# Patient Record
Sex: FEMALE | Race: WHITE | NOT HISPANIC OR LATINO | Employment: UNEMPLOYED | URBAN - METROPOLITAN AREA
[De-identification: names, ages, dates, MRNs, and addresses within clinical notes are randomized per-mention and may not be internally consistent; named-entity substitution may affect disease eponyms.]

---

## 2017-07-01 ENCOUNTER — APPOINTMENT (EMERGENCY)
Dept: CT IMAGING | Facility: HOSPITAL | Age: 67
End: 2017-07-01
Payer: COMMERCIAL

## 2017-07-01 ENCOUNTER — HOSPITAL ENCOUNTER (EMERGENCY)
Facility: HOSPITAL | Age: 67
Discharge: HOME | End: 2017-07-01
Attending: EMERGENCY MEDICINE | Admitting: EMERGENCY MEDICINE
Payer: COMMERCIAL

## 2017-07-01 VITALS
HEART RATE: 100 BPM | TEMPERATURE: 97.6 F | OXYGEN SATURATION: 96 % | SYSTOLIC BLOOD PRESSURE: 170 MMHG | DIASTOLIC BLOOD PRESSURE: 77 MMHG | WEIGHT: 245 LBS | RESPIRATION RATE: 18 BRPM

## 2017-07-01 DIAGNOSIS — N20.0 NEPHROLITHIASIS: Primary | ICD-10-CM

## 2017-07-01 DIAGNOSIS — R10.9 LEFT FLANK PAIN: ICD-10-CM

## 2017-07-01 LAB
ALBUMIN SERPL BCP-MCNC: 3.6 G/DL (ref 3.5–5)
ALP SERPL-CCNC: 82 U/L (ref 46–116)
ALT SERPL W P-5'-P-CCNC: 33 U/L (ref 12–78)
ANION GAP SERPL CALCULATED.3IONS-SCNC: 9 MMOL/L (ref 4–13)
AST SERPL W P-5'-P-CCNC: 15 U/L (ref 5–45)
BACTERIA UR QL AUTO: ABNORMAL /HPF
BASOPHILS # BLD AUTO: 0.03 THOUSANDS/ΜL (ref 0–0.1)
BASOPHILS NFR BLD AUTO: 0 % (ref 0–1)
BILIRUB SERPL-MCNC: 0.39 MG/DL (ref 0.2–1)
BILIRUB UR QL STRIP: ABNORMAL
BUN SERPL-MCNC: 21 MG/DL (ref 5–25)
CALCIUM SERPL-MCNC: 8.9 MG/DL (ref 8.3–10.1)
CHLORIDE SERPL-SCNC: 99 MMOL/L (ref 100–108)
CLARITY UR: ABNORMAL
CO2 SERPL-SCNC: 26 MMOL/L (ref 21–32)
COLOR UR: YELLOW
CREAT SERPL-MCNC: 0.9 MG/DL (ref 0.6–1.3)
EOSINOPHIL # BLD AUTO: 0.03 THOUSAND/ΜL (ref 0–0.61)
EOSINOPHIL NFR BLD AUTO: 0 % (ref 0–6)
ERYTHROCYTE [DISTWIDTH] IN BLOOD BY AUTOMATED COUNT: 15.4 % (ref 11.6–15.1)
GFR SERPL CREATININE-BSD FRML MDRD: >60 ML/MIN/1.73SQ M
GLUCOSE SERPL-MCNC: 126 MG/DL (ref 65–140)
GLUCOSE UR STRIP-MCNC: NEGATIVE MG/DL
HCT VFR BLD AUTO: 36.9 % (ref 34.8–46.1)
HGB BLD-MCNC: 12.1 G/DL (ref 11.5–15.4)
HGB UR QL STRIP.AUTO: ABNORMAL
KETONES UR STRIP-MCNC: NEGATIVE MG/DL
LEUKOCYTE ESTERASE UR QL STRIP: ABNORMAL
LYMPHOCYTES # BLD AUTO: 1.88 THOUSANDS/ΜL (ref 0.6–4.47)
LYMPHOCYTES NFR BLD AUTO: 11 % (ref 14–44)
MCH RBC QN AUTO: 26.1 PG (ref 26.8–34.3)
MCHC RBC AUTO-ENTMCNC: 32.8 G/DL (ref 31.4–37.4)
MCV RBC AUTO: 80 FL (ref 82–98)
MONOCYTES # BLD AUTO: 1.22 THOUSAND/ΜL (ref 0.17–1.22)
MONOCYTES NFR BLD AUTO: 7 % (ref 4–12)
NEUTROPHILS # BLD AUTO: 13.99 THOUSANDS/ΜL (ref 1.85–7.62)
NEUTS SEG NFR BLD AUTO: 82 % (ref 43–75)
NITRITE UR QL STRIP: NEGATIVE
NON-SQ EPI CELLS URNS QL MICRO: ABNORMAL /HPF
NRBC BLD AUTO-RTO: 0 /100 WBCS
PH UR STRIP.AUTO: 5.5 [PH] (ref 4.5–8)
PLATELET # BLD AUTO: 332 THOUSANDS/UL (ref 149–390)
PMV BLD AUTO: 9.9 FL (ref 8.9–12.7)
POTASSIUM SERPL-SCNC: 4.3 MMOL/L (ref 3.5–5.3)
PROT SERPL-MCNC: 7.8 G/DL (ref 6.4–8.2)
PROT UR STRIP-MCNC: ABNORMAL MG/DL
RBC # BLD AUTO: 4.64 MILLION/UL (ref 3.81–5.12)
RBC #/AREA URNS AUTO: ABNORMAL /HPF
SODIUM SERPL-SCNC: 134 MMOL/L (ref 136–145)
SP GR UR STRIP.AUTO: >=1.03 (ref 1–1.03)
UROBILINOGEN UR QL STRIP.AUTO: 0.2 E.U./DL
WBC # BLD AUTO: 17.15 THOUSAND/UL (ref 4.31–10.16)
WBC #/AREA URNS AUTO: ABNORMAL /HPF

## 2017-07-01 PROCEDURE — 85025 COMPLETE CBC W/AUTO DIFF WBC: CPT | Performed by: EMERGENCY MEDICINE

## 2017-07-01 PROCEDURE — 87086 URINE CULTURE/COLONY COUNT: CPT

## 2017-07-01 PROCEDURE — 96361 HYDRATE IV INFUSION ADD-ON: CPT

## 2017-07-01 PROCEDURE — 96374 THER/PROPH/DIAG INJ IV PUSH: CPT

## 2017-07-01 PROCEDURE — 99284 EMERGENCY DEPT VISIT MOD MDM: CPT

## 2017-07-01 PROCEDURE — 80053 COMPREHEN METABOLIC PANEL: CPT | Performed by: EMERGENCY MEDICINE

## 2017-07-01 PROCEDURE — 96375 TX/PRO/DX INJ NEW DRUG ADDON: CPT

## 2017-07-01 PROCEDURE — 81002 URINALYSIS NONAUTO W/O SCOPE: CPT | Performed by: EMERGENCY MEDICINE

## 2017-07-01 PROCEDURE — 81001 URINALYSIS AUTO W/SCOPE: CPT

## 2017-07-01 PROCEDURE — 74177 CT ABD & PELVIS W/CONTRAST: CPT

## 2017-07-01 PROCEDURE — 36415 COLL VENOUS BLD VENIPUNCTURE: CPT | Performed by: EMERGENCY MEDICINE

## 2017-07-01 RX ORDER — KETOROLAC TROMETHAMINE 30 MG/ML
15 INJECTION, SOLUTION INTRAMUSCULAR; INTRAVENOUS ONCE
Status: COMPLETED | OUTPATIENT
Start: 2017-07-01 | End: 2017-07-01

## 2017-07-01 RX ORDER — ONDANSETRON 2 MG/ML
4 INJECTION INTRAMUSCULAR; INTRAVENOUS ONCE
Status: COMPLETED | OUTPATIENT
Start: 2017-07-01 | End: 2017-07-01

## 2017-07-01 RX ORDER — HYDROCODONE BITARTRATE AND ACETAMINOPHEN 5; 325 MG/1; MG/1
1 TABLET ORAL EVERY 6 HOURS PRN
Qty: 10 TABLET | Refills: 0 | Status: SHIPPED | OUTPATIENT
Start: 2017-07-01 | End: 2018-07-15 | Stop reason: ALTCHOICE

## 2017-07-01 RX ORDER — NAPROXEN 500 MG/1
500 TABLET ORAL 2 TIMES DAILY WITH MEALS
Qty: 30 TABLET | Refills: 0 | Status: SHIPPED | OUTPATIENT
Start: 2017-07-01 | End: 2018-07-15 | Stop reason: ALTCHOICE

## 2017-07-01 RX ADMIN — SODIUM CHLORIDE 1000 ML: 0.9 INJECTION, SOLUTION INTRAVENOUS at 18:12

## 2017-07-01 RX ADMIN — KETOROLAC TROMETHAMINE 15 MG: 30 INJECTION, SOLUTION INTRAMUSCULAR at 18:09

## 2017-07-01 RX ADMIN — ONDANSETRON 4 MG: 2 INJECTION INTRAMUSCULAR; INTRAVENOUS at 18:11

## 2017-07-01 RX ADMIN — IOHEXOL 100 ML: 350 INJECTION, SOLUTION INTRAVENOUS at 18:55

## 2017-07-02 LAB — BACTERIA UR CULT: NORMAL

## 2017-07-06 ENCOUNTER — ALLSCRIPTS OFFICE VISIT (OUTPATIENT)
Dept: OTHER | Facility: OTHER | Age: 67
End: 2017-07-06

## 2017-07-06 DIAGNOSIS — N20.1 CALCULUS OF URETER: ICD-10-CM

## 2017-07-06 LAB
BILIRUB UR QL STRIP: NEGATIVE
CLARITY UR: NORMAL
COLOR UR: YELLOW
GLUCOSE (HISTORICAL): NEGATIVE
HGB UR QL STRIP.AUTO: NORMAL
KETONES UR STRIP-MCNC: NEGATIVE MG/DL
LEUKOCYTE ESTERASE UR QL STRIP: NORMAL
NITRITE UR QL STRIP: NEGATIVE
PH UR STRIP.AUTO: 7 [PH]
PROT UR STRIP-MCNC: NEGATIVE MG/DL
SP GR UR STRIP.AUTO: 1.01
UROBILINOGEN UR QL STRIP.AUTO: NORMAL

## 2017-07-24 ENCOUNTER — ALLSCRIPTS OFFICE VISIT (OUTPATIENT)
Dept: OTHER | Facility: OTHER | Age: 67
End: 2017-07-24

## 2017-07-24 LAB
BILIRUB UR QL STRIP: NORMAL
CLARITY UR: NORMAL
COLOR UR: YELLOW
GLUCOSE (HISTORICAL): NORMAL
HGB UR QL STRIP.AUTO: NORMAL
KETONES UR STRIP-MCNC: NORMAL MG/DL
LEUKOCYTE ESTERASE UR QL STRIP: NORMAL
NITRITE UR QL STRIP: NORMAL
PH UR STRIP.AUTO: 6 [PH]
PROT UR STRIP-MCNC: NORMAL MG/DL
SP GR UR STRIP.AUTO: 1.02
UROBILINOGEN UR QL STRIP.AUTO: 0.2

## 2017-10-20 ENCOUNTER — ALLSCRIPTS OFFICE VISIT (OUTPATIENT)
Dept: OTHER | Facility: OTHER | Age: 67
End: 2017-10-20

## 2017-10-20 ENCOUNTER — APPOINTMENT (OUTPATIENT)
Dept: LAB | Facility: HOSPITAL | Age: 67
End: 2017-10-20
Payer: COMMERCIAL

## 2017-10-20 DIAGNOSIS — R31.29 OTHER MICROSCOPIC HEMATURIA: ICD-10-CM

## 2017-10-20 LAB
BACTERIA UR QL AUTO: ABNORMAL /HPF
BILIRUB UR QL STRIP: NEGATIVE
BILIRUB UR QL STRIP: NORMAL
CLARITY UR: ABNORMAL
CLARITY UR: NORMAL
COLOR UR: YELLOW
COLOR UR: YELLOW
GLUCOSE (HISTORICAL): NORMAL
GLUCOSE UR STRIP-MCNC: NEGATIVE MG/DL
HGB UR QL STRIP.AUTO: NEGATIVE
HGB UR QL STRIP.AUTO: NORMAL
HYALINE CASTS #/AREA URNS LPF: ABNORMAL /LPF
KETONES UR STRIP-MCNC: NEGATIVE MG/DL
KETONES UR STRIP-MCNC: NORMAL MG/DL
LEUKOCYTE ESTERASE UR QL STRIP: ABNORMAL
LEUKOCYTE ESTERASE UR QL STRIP: NORMAL
NITRITE UR QL STRIP: NEGATIVE
NITRITE UR QL STRIP: NORMAL
NON-SQ EPI CELLS URNS QL MICRO: ABNORMAL /HPF
PH UR STRIP.AUTO: 6.5 [PH]
PH UR STRIP.AUTO: 6.5 [PH] (ref 4.5–8)
PROT UR STRIP-MCNC: NEGATIVE MG/DL
PROT UR STRIP-MCNC: NORMAL MG/DL
RBC #/AREA URNS AUTO: ABNORMAL /HPF
SP GR UR STRIP.AUTO: 1.01
SP GR UR STRIP.AUTO: 1.02 (ref 1–1.03)
UROBILINOGEN UR QL STRIP.AUTO: 0.2
UROBILINOGEN UR QL STRIP.AUTO: 0.2 E.U./DL
WBC #/AREA URNS AUTO: ABNORMAL /HPF

## 2017-10-20 PROCEDURE — 81001 URINALYSIS AUTO W/SCOPE: CPT

## 2017-10-24 DIAGNOSIS — N13.2 HYDRONEPHROSIS WITH RENAL AND URETERAL CALCULUS OBSTRUCTION: ICD-10-CM

## 2018-01-13 VITALS
HEIGHT: 62 IN | DIASTOLIC BLOOD PRESSURE: 88 MMHG | WEIGHT: 258 LBS | BODY MASS INDEX: 47.48 KG/M2 | SYSTOLIC BLOOD PRESSURE: 172 MMHG

## 2018-01-13 VITALS
WEIGHT: 247 LBS | HEIGHT: 62 IN | BODY MASS INDEX: 45.45 KG/M2 | SYSTOLIC BLOOD PRESSURE: 126 MMHG | DIASTOLIC BLOOD PRESSURE: 72 MMHG

## 2018-01-14 VITALS
BODY MASS INDEX: 42.51 KG/M2 | WEIGHT: 249 LBS | SYSTOLIC BLOOD PRESSURE: 138 MMHG | DIASTOLIC BLOOD PRESSURE: 86 MMHG | HEIGHT: 64 IN

## 2018-07-15 ENCOUNTER — HOSPITAL ENCOUNTER (EMERGENCY)
Facility: HOSPITAL | Age: 68
Discharge: HOME | End: 2018-07-15
Attending: EMERGENCY MEDICINE | Admitting: EMERGENCY MEDICINE
Payer: COMMERCIAL

## 2018-07-15 ENCOUNTER — APPOINTMENT (EMERGENCY)
Dept: CT IMAGING | Facility: HOSPITAL | Age: 68
End: 2018-07-15
Payer: COMMERCIAL

## 2018-07-15 VITALS
SYSTOLIC BLOOD PRESSURE: 149 MMHG | TEMPERATURE: 97.7 F | OXYGEN SATURATION: 98 % | DIASTOLIC BLOOD PRESSURE: 66 MMHG | RESPIRATION RATE: 18 BRPM | HEART RATE: 96 BPM | WEIGHT: 255.2 LBS | BODY MASS INDEX: 46.68 KG/M2

## 2018-07-15 DIAGNOSIS — N20.0 RIGHT KIDNEY STONE: Primary | ICD-10-CM

## 2018-07-15 LAB
ANION GAP SERPL CALCULATED.3IONS-SCNC: 7 MMOL/L (ref 4–13)
BACTERIA UR QL AUTO: ABNORMAL /HPF
BILIRUB UR QL STRIP: NEGATIVE
BUN SERPL-MCNC: 26 MG/DL (ref 5–25)
CALCIUM SERPL-MCNC: 9.9 MG/DL (ref 8.3–10.1)
CHLORIDE SERPL-SCNC: 102 MMOL/L (ref 100–108)
CLARITY UR: ABNORMAL
CO2 SERPL-SCNC: 27 MMOL/L (ref 21–32)
COLOR UR: YELLOW
CREAT SERPL-MCNC: 1 MG/DL (ref 0.6–1.3)
GFR SERPL CREATININE-BSD FRML MDRD: 58 ML/MIN/1.73SQ M
GLUCOSE SERPL-MCNC: 137 MG/DL (ref 65–140)
GLUCOSE UR STRIP-MCNC: NEGATIVE MG/DL
HGB UR QL STRIP.AUTO: ABNORMAL
KETONES UR STRIP-MCNC: NEGATIVE MG/DL
LEUKOCYTE ESTERASE UR QL STRIP: ABNORMAL
NITRITE UR QL STRIP: NEGATIVE
NON-SQ EPI CELLS URNS QL MICRO: ABNORMAL /HPF
PH UR STRIP.AUTO: 5.5 [PH] (ref 4.5–8)
POTASSIUM SERPL-SCNC: 4.9 MMOL/L (ref 3.5–5.3)
POTASSIUM SERPL-SCNC: 5.5 MMOL/L (ref 3.5–5.3)
PROT UR STRIP-MCNC: ABNORMAL MG/DL
RBC #/AREA URNS AUTO: ABNORMAL /HPF
SODIUM SERPL-SCNC: 136 MMOL/L (ref 136–145)
SP GR UR STRIP.AUTO: 1.02 (ref 1–1.03)
UROBILINOGEN UR QL STRIP.AUTO: 0.2 E.U./DL
WBC #/AREA URNS AUTO: ABNORMAL /HPF

## 2018-07-15 PROCEDURE — 80048 BASIC METABOLIC PNL TOTAL CA: CPT | Performed by: EMERGENCY MEDICINE

## 2018-07-15 PROCEDURE — 36415 COLL VENOUS BLD VENIPUNCTURE: CPT | Performed by: EMERGENCY MEDICINE

## 2018-07-15 PROCEDURE — 99284 EMERGENCY DEPT VISIT MOD MDM: CPT

## 2018-07-15 PROCEDURE — 96361 HYDRATE IV INFUSION ADD-ON: CPT

## 2018-07-15 PROCEDURE — 74176 CT ABD & PELVIS W/O CONTRAST: CPT

## 2018-07-15 PROCEDURE — 81001 URINALYSIS AUTO W/SCOPE: CPT

## 2018-07-15 PROCEDURE — 96374 THER/PROPH/DIAG INJ IV PUSH: CPT

## 2018-07-15 PROCEDURE — 84132 ASSAY OF SERUM POTASSIUM: CPT | Performed by: EMERGENCY MEDICINE

## 2018-07-15 PROCEDURE — 96375 TX/PRO/DX INJ NEW DRUG ADDON: CPT

## 2018-07-15 RX ORDER — FENTANYL CITRATE 50 UG/ML
50 INJECTION, SOLUTION INTRAMUSCULAR; INTRAVENOUS ONCE
Status: COMPLETED | OUTPATIENT
Start: 2018-07-15 | End: 2018-07-15

## 2018-07-15 RX ORDER — MORPHINE SULFATE 4 MG/ML
4 INJECTION, SOLUTION INTRAMUSCULAR; INTRAVENOUS ONCE
Status: DISCONTINUED | OUTPATIENT
Start: 2018-07-15 | End: 2018-07-15

## 2018-07-15 RX ORDER — ONDANSETRON 2 MG/ML
4 INJECTION INTRAMUSCULAR; INTRAVENOUS ONCE
Status: COMPLETED | OUTPATIENT
Start: 2018-07-15 | End: 2018-07-15

## 2018-07-15 RX ORDER — ONDANSETRON 4 MG/1
4 TABLET, ORALLY DISINTEGRATING ORAL EVERY 6 HOURS PRN
Qty: 12 TABLET | Refills: 0 | Status: SHIPPED | OUTPATIENT
Start: 2018-07-15 | End: 2018-07-17

## 2018-07-15 RX ORDER — HYDROCODONE BITARTRATE AND ACETAMINOPHEN 5; 325 MG/1; MG/1
1 TABLET ORAL EVERY 6 HOURS PRN
Qty: 10 TABLET | Refills: 0 | Status: SHIPPED | OUTPATIENT
Start: 2018-07-15 | End: 2018-07-17

## 2018-07-15 RX ORDER — IBUPROFEN 600 MG/1
600 TABLET ORAL EVERY 6 HOURS PRN
Qty: 20 TABLET | Refills: 0 | Status: SHIPPED | OUTPATIENT
Start: 2018-07-15

## 2018-07-15 RX ORDER — LISINOPRIL 10 MG/1
10 TABLET ORAL 2 TIMES DAILY
COMMUNITY

## 2018-07-15 RX ADMIN — FENTANYL CITRATE 50 MCG: 50 INJECTION INTRAMUSCULAR; INTRAVENOUS at 13:15

## 2018-07-15 RX ADMIN — SODIUM CHLORIDE 1000 ML: 0.9 INJECTION, SOLUTION INTRAVENOUS at 13:12

## 2018-07-15 RX ADMIN — ONDANSETRON 4 MG: 2 INJECTION INTRAMUSCULAR; INTRAVENOUS at 13:13

## 2018-07-15 NOTE — ED NOTES
Pt's O2 saturation registering at 92% on room air  Upon entering room pt maintaining own airway and breathing RR at 14  Pt states, "I feel very relaxed right now  I don't feel any pain " Pt placed on 2 L/min DOROTA Cobian RN  07/15/18 5103

## 2018-07-15 NOTE — DISCHARGE INSTRUCTIONS
Kidney Stones   WHAT YOU NEED TO KNOW:   Kidney stones form in the urinary system when the water and waste in your urine are out of balance  When this happens, certain types of waste crystals separate from the urine  The crystals build up and form kidney stones  You may have 1 or more kidney stones  DISCHARGE INSTRUCTIONS:   Return to the emergency department if:   · You have vomiting that is not relieved by medicine  Contact your healthcare provider if:   · You have a fever  · You have trouble passing urine  · You see blood in your urine  · You have severe pain  · You have any questions or concerns about your condition or care  Medicines:   · NSAIDs , such as ibuprofen, help decrease swelling, pain, and fever  This medicine is available with or without a doctor's order  NSAIDs can cause stomach bleeding or kidney problems in certain people  If you take blood thinner medicine, always ask your healthcare provider if NSAIDs are safe for you  Always read the medicine label and follow directions  · Prescription medicine  may be given  Ask how to take this medicine safely  · Medicines  to balance your electrolytes may be needed  · Take your medicine as directed  Contact your healthcare provider if you think your medicine is not helping or if you have side effects  Tell him or her if you are allergic to any medicine  Keep a list of the medicines, vitamins, and herbs you take  Include the amounts, and when and why you take them  Bring the list or the pill bottles to follow-up visits  Carry your medicine list with you in case of an emergency  Follow up with your healthcare provider as directed: You may need to return for more tests  Write down your questions so you remember to ask them during your visits  Self-care:   · Drink plenty of liquids  Your healthcare provider may tell you to drink at least 8 to 12 (eight-ounce) cups of liquids each day   This helps flush out the kidney stones when you urinate  Water is the best liquid to drink  · Strain your urine every time you go to the bathroom  Urinate through a strainer or a piece of thin cloth to catch the stones  Take the stones to your healthcare provider so they can be sent to the lab for tests  This will help your healthcare providers plan the best treatment for you  · Eat a variety of healthy foods  Healthy foods include fruits, vegetables, whole-grain breads, low-fat dairy products, beans, and fish  You may need to limit how much sodium (salt) or protein you eat  Ask for information about the best foods for you  · Stay active  Your stones may pass more easily by if you stay active  Ask about the best activities for you  After you pass your kidney stones:  Once you have passed your kidney stones, your healthcare provider may  order a 24-hour urine test  Results from a 24-hour urine test will help your healthcare provider plan ways to prevent more stones from forming  If you are told to do a 24-hour test, your healthcare provider will give you more instructions  © 2017 [facility] Information is for End User's use only and may not be sold, redistributed or otherwise used for commercial purposes  All illustrations and images included in CareNotes® are the copyrighted property of A D A M , Inc  or Anish Herrera  The above information is an  only  It is not intended as medical advice for individual conditions or treatments  Talk to your doctor, nurse or pharmacist before following any medical regimen to see if it is safe and effective for you

## 2018-07-15 NOTE — ED NOTES
Patient ambulated to restroom at this time  Steady gait noted  No complaints  Urine strained with no evidence of stone present  Dr Florinda Stevens made aware        Brandon Mars RN  07/15/18 5091

## 2018-07-15 NOTE — ED PROVIDER NOTES
History  Chief Complaint   Patient presents with    Flank Pain     right flank pain, started at 10am, history of kidney stones, difficulty passing urine  A 69-year-old female with past medical history of GERD and hypertension; presents with right flank pain  Pain does radiate into the right abdomen  Pain began around 10:00 am this morning  Patient states she has also had difficulty initiating urination  Patient has had nausea but denies vomiting  Patient does report a history of kidney stones, stating her current pain feels similar  Patient did take 2 tablets of Toradol before coming into the emergency department, however gain minimal relief  Patient has not had fevers, chills, chest pain, shortness of breath, dysuria, hematuria, urinary frequency/urgency, peripheral edema and rashes  Patient was seen on 7/12 at urgent care, with symptoms of a urinary tract infection  Patient was diagnosed with a UTI at that time, and has been on Keflex since  Patient states her kidney stones do usually start with symptoms of a UTI  Patient's last stone was approximately 1 year ago, which she passed spontaneously  A/P: Right flank pain, similar symptoms to prior kidney stones  Patient does appear significantly uncomfortable  Right CVA tenderness  Benign abdominal exam   Will check kidney function and CT scan for further evaluation  Will check urine for infection  Will give IVF, pain medication and anti medics  History provided by:  Patient and medical records    Prior to Admission Medications   Prescriptions Last Dose Informant Patient Reported? Taking?    OMEPRAZOLE PO   Yes Yes   Sig: Take 1 tablet by mouth daily   lisinopril (ZESTRIL) 10 mg tablet   Yes Yes   Sig: Take 10 mg by mouth 2 (two) times a day      Facility-Administered Medications: None       Past Medical History:   Diagnosis Date    Cancer ([facility] [address] )     GERD (gastroesophageal reflux disease)     Hypertension        Past Surgical History: Procedure Laterality Date    EYE SURGERY         History reviewed  No pertinent family history  I have reviewed and agree with the history as documented  Social History   Substance Use Topics    Smoking status: Never Smoker    Smokeless tobacco: Never Used    Alcohol use 1 2 oz/week     2 Glasses of wine per week        Review of Systems   Gastrointestinal: Positive for nausea and vomiting  Genitourinary: Positive for difficulty urinating and flank pain ( right)  All other systems reviewed and are negative  Physical Exam  Physical Exam  General Appearance: alert and oriented, appears uncomfortable, non toxic appearing  Skin:  Warm, dry, intact  HEENT: atraumatic, normocephalic  Neck: Supple, trachea midline  Cardiac: RRR; no murmurs, rub, gallops  Pulmonary: lungs CTAB; no wheezes, rales, rhonchi  Gastrointestinal: abdomen soft, nontender, nondistended; no guarding or rebound tenderness; good bowel sounds, no mass or bruits  Right CVA tenderness    Extremities:  no pedal edema, 2+ pulses; no calf tenderness, no clubbing, no cyanosis  Neuro:  no focal motor or sensory deficits, CN 2-12 grossly intact  Psych:  Normal mood and affect, normal judgement and insight      Vital Signs  ED Triage Vitals [07/15/18 1220]   Temperature Pulse Respirations Blood Pressure SpO2   97 7 °F (36 5 °C) 96 18 (!) 214/99 98 %      Temp Source Heart Rate Source Patient Position - Orthostatic VS BP Location FiO2 (%)   Oral Monitor Sitting Right arm --      Pain Score       Worst Possible Pain           Vitals:    07/15/18 1321 07/15/18 1330 07/15/18 1433 07/15/18 1510   BP: (!) 186/86 144/67 144/67 149/66   Pulse: 90 94 88 96   Patient Position - Orthostatic VS: Sitting  Lying Lying       Visual Acuity      ED Medications  Medications   sodium chloride 0 9 % bolus 1,000 mL (0 mL Intravenous Stopped 7/15/18 1506)   ondansetron (ZOFRAN) injection 4 mg (4 mg Intravenous Given 7/15/18 1313)   fentanyl citrate (PF) 100 MCG/2ML 50 mcg (50 mcg Intravenous Given 7/15/18 1315)       Diagnostic Studies  Results Reviewed     Procedure Component Value Units Date/Time    Potassium [[id number]]  (Normal) Collected:  07/15/18 1506    Lab Status:  Final result Specimen:  Blood from Arm, Left Updated:  07/15/18 1525     Potassium 4 9 mmol/L     Basic metabolic panel [[id number]]  (Abnormal) Collected:  07/15/18 1253    Lab Status:  Final result Specimen:  Blood from Hand, Left Updated:  07/15/18 1310     Sodium 136 mmol/L      Potassium 5 5 (H) mmol/L      Chloride 102 mmol/L      CO2 27 mmol/L      Anion Gap 7 mmol/L      BUN 26 (H) mg/dL      Creatinine 1 00 mg/dL      Glucose 137 mg/dL      Calcium 9 9 mg/dL      eGFR 58 ml/min/1 73sq m     Narrative:         National Kidney Disease Education Program recommendations are as follows:  GFR calculation is accurate only with a steady state creatinine  Chronic Kidney disease less than 60 ml/min/1 73 sq  meters  Kidney failure less than 15 ml/min/1 73 sq  meters      Urine Microscopic [[id number]]  (Abnormal) Collected:  07/15/18 1236    Lab Status:  Final result Specimen:  Urine from Urine, Clean Catch Updated:  07/15/18 1303     RBC, UA 20-30 (A) /hpf      WBC, UA 4-10 (A) /hpf      Epithelial Cells Occasional /hpf      Bacteria, UA Occasional /hpf     POCT urinalysis dipstick [[id number]]  (Abnormal) Resulted:  07/15/18 1231    Lab Status:  Final result Updated:  07/15/18 1231    ED Urine Macroscopic [[id number]]  (Abnormal) Collected:  07/15/18 1236    Lab Status:  Final result Specimen:  Urine Updated:  07/15/18 1231     Color, UA Yellow     Clarity, UA Slightly Cloudy     pH, UA 5 5     Leukocytes, UA Small (A)     Nitrite, UA Negative     Protein, UA 30 (1+) (A) mg/dl      Glucose, UA Negative mg/dl      Ketones, UA Negative mg/dl      Urobilinogen, UA 0 2 E U /dl      Bilirubin, UA Negative     Blood, UA Large (A)     Specific [place], UA 1 025    Narrative:       CLINITEK RESULT                 CT renal stone study abdomen pelvis without contrast   Final Result by Taylor Rocha MD (07/15 1315)         1  Right ureterovesical junction 3 mm calculus causing mild right hydroureteronephrosis  2   Bilateral renal subcentimeter nonobstructing calculi  3   Cholelithiasis  4   Moderate-sized hiatal hernia  Workstation performed: [id number]                    Procedures  Procedures       Phone Contacts  ED Phone Contact    ED Course  ED Course as of Jul 16 1406   Sun Jul 15, 2018   1408 Patient resting more comfortably at this time  Will repeat potassium as initial level is hemolyzed  1409 Right 3mm UVJ stone moderate hydronephrosis CT renal stone study abdomen pelvis without contrast   1533 Potassium: 4 9   1548 Pt resting comfortably at this time  Pain well controlled  Nausea resolved  Pt able to urinate without difficulty  Will discharge home with urology follow up  Return precautions given  Recommend completion of Keflex that was started at Urgent Care  MDM  CritCare Time    Disposition  Final diagnoses:   Right kidney stone     Time reflects when diagnosis was documented in both MDM as applicable and the Disposition within this note     Time User Action Codes Description Comment    7/15/2018  3:49 PM Marilynn, [address]] Right kidney stone       ED Disposition     ED Disposition Condition Comment    Discharge  Ronnie Jackson discharge to home/self care      Condition at discharge: Good        Follow-up Information     Follow up With Specialties Details Why Contact Info Additional Information    Brayan Washington MD Urology Schedule an appointment as soon as possible for a visit in 2 days For re-evaluation [address] [place] [address] Emergency Department Emergency Medicine Go to If symptoms worsen [address] [place] [address]  [phone] [address] [place] [address] ED, [facility] [place], [address]          Discharge Medication List as of 7/15/2018  3:55 PM      START taking these medications    Details   HYDROcodone-acetaminophen (NORCO) 5-325 mg per tablet Take 1 tablet by mouth every 6 (six) hours as needed for pain for up to 10 days Max Daily Amount: 4 tablets, Starting Sun 7/15/2018, Until Wed 7/25/2018, Print      ibuprofen (MOTRIN) 600 mg tablet Take 1 tablet (600 mg total) by mouth every 6 (six) hours as needed for mild pain, Starting Sun 7/15/2018, Print      ondansetron (ZOFRAN-ODT) 4 mg disintegrating tablet Take 1 tablet (4 mg total) by mouth every 6 (six) hours as needed for nausea or vomiting, Starting Sun 7/15/2018, Print         CONTINUE these medications which have NOT CHANGED    Details   lisinopril (ZESTRIL) 10 mg tablet Take 10 mg by mouth 2 (two) times a day, Historical Med      OMEPRAZOLE PO Take 1 tablet by mouth daily, Historical Med           No discharge procedures on file      ED Provider  Electronically Signed by           Ailin Elkins DO  07/16/18 7684

## 2018-07-17 ENCOUNTER — APPOINTMENT (EMERGENCY)
Dept: CT IMAGING | Facility: HOSPITAL | Age: 68
End: 2018-07-17
Payer: COMMERCIAL

## 2018-07-17 ENCOUNTER — HOSPITAL ENCOUNTER (EMERGENCY)
Facility: HOSPITAL | Age: 68
Discharge: HOME | End: 2018-07-17
Payer: COMMERCIAL

## 2018-07-17 VITALS
OXYGEN SATURATION: 96 % | BODY MASS INDEX: 47.19 KG/M2 | SYSTOLIC BLOOD PRESSURE: 179 MMHG | WEIGHT: 258 LBS | DIASTOLIC BLOOD PRESSURE: 74 MMHG | TEMPERATURE: 97.5 F | RESPIRATION RATE: 16 BRPM | HEART RATE: 92 BPM

## 2018-07-17 DIAGNOSIS — N23 RENAL COLIC ON RIGHT SIDE: Primary | ICD-10-CM

## 2018-07-17 DIAGNOSIS — R10.9 FLANK PAIN: ICD-10-CM

## 2018-07-17 DIAGNOSIS — I10 HYPERTENSION, UNSPECIFIED TYPE: ICD-10-CM

## 2018-07-17 DIAGNOSIS — D72.829 LEUKOCYTOSIS, UNSPECIFIED TYPE: ICD-10-CM

## 2018-07-17 DIAGNOSIS — R31.9 HEMATURIA, UNSPECIFIED TYPE: ICD-10-CM

## 2018-07-17 DIAGNOSIS — E66.9 OBESITY: ICD-10-CM

## 2018-07-17 LAB
ALBUMIN SERPL BCP-MCNC: 3.7 G/DL (ref 3.5–5)
ALP SERPL-CCNC: 70 U/L (ref 46–116)
ALT SERPL W P-5'-P-CCNC: 26 U/L (ref 12–78)
ANION GAP SERPL CALCULATED.3IONS-SCNC: 7 MMOL/L (ref 4–13)
AST SERPL W P-5'-P-CCNC: 17 U/L (ref 5–45)
BACTERIA UR QL AUTO: ABNORMAL /HPF
BASOPHILS # BLD AUTO: 0.03 THOUSANDS/ΜL (ref 0–0.1)
BASOPHILS NFR BLD AUTO: 0 % (ref 0–1)
BILIRUB SERPL-MCNC: 0.42 MG/DL (ref 0.2–1)
BILIRUB UR QL STRIP: NEGATIVE
BUN SERPL-MCNC: 19 MG/DL (ref 5–25)
CALCIUM SERPL-MCNC: 9.3 MG/DL (ref 8.3–10.1)
CHLORIDE SERPL-SCNC: 96 MMOL/L (ref 100–108)
CLARITY UR: CLEAR
CO2 SERPL-SCNC: 28 MMOL/L (ref 21–32)
COLOR UR: YELLOW
CREAT SERPL-MCNC: 1 MG/DL (ref 0.6–1.3)
EOSINOPHIL # BLD AUTO: 0.17 THOUSAND/ΜL (ref 0–0.61)
EOSINOPHIL NFR BLD AUTO: 1 % (ref 0–6)
ERYTHROCYTE [DISTWIDTH] IN BLOOD BY AUTOMATED COUNT: 15.3 % (ref 11.6–15.1)
GFR SERPL CREATININE-BSD FRML MDRD: 58 ML/MIN/1.73SQ M
GLUCOSE SERPL-MCNC: 116 MG/DL (ref 65–140)
GLUCOSE UR STRIP-MCNC: NEGATIVE MG/DL
HCT VFR BLD AUTO: 37 % (ref 34.8–46.1)
HGB BLD-MCNC: 11.5 G/DL (ref 11.5–15.4)
HGB UR QL STRIP.AUTO: ABNORMAL
KETONES UR STRIP-MCNC: NEGATIVE MG/DL
LEUKOCYTE ESTERASE UR QL STRIP: ABNORMAL
LYMPHOCYTES # BLD AUTO: 3.21 THOUSANDS/ΜL (ref 0.6–4.47)
LYMPHOCYTES NFR BLD AUTO: 20 % (ref 14–44)
MCH RBC QN AUTO: 25.5 PG (ref 26.8–34.3)
MCHC RBC AUTO-ENTMCNC: 31.1 G/DL (ref 31.4–37.4)
MCV RBC AUTO: 82 FL (ref 82–98)
MONOCYTES # BLD AUTO: 1.29 THOUSAND/ΜL (ref 0.17–1.22)
MONOCYTES NFR BLD AUTO: 8 % (ref 4–12)
NEUTROPHILS # BLD AUTO: 11.77 THOUSANDS/ΜL (ref 1.85–7.62)
NEUTS SEG NFR BLD AUTO: 72 % (ref 43–75)
NITRITE UR QL STRIP: NEGATIVE
NON-SQ EPI CELLS URNS QL MICRO: ABNORMAL /HPF
NRBC BLD AUTO-RTO: 0 /100 WBCS
PH UR STRIP.AUTO: 5.5 [PH] (ref 4.5–8)
PLATELET # BLD AUTO: 342 THOUSANDS/UL (ref 149–390)
PMV BLD AUTO: 10.1 FL (ref 8.9–12.7)
POTASSIUM SERPL-SCNC: 4.2 MMOL/L (ref 3.5–5.3)
PROT SERPL-MCNC: 7.5 G/DL (ref 6.4–8.2)
PROT UR STRIP-MCNC: NEGATIVE MG/DL
RBC # BLD AUTO: 4.51 MILLION/UL (ref 3.81–5.12)
RBC #/AREA URNS AUTO: ABNORMAL /HPF
SODIUM SERPL-SCNC: 131 MMOL/L (ref 136–145)
SP GR UR STRIP.AUTO: <=1.005 (ref 1–1.03)
UROBILINOGEN UR QL STRIP.AUTO: 0.2 E.U./DL
WBC # BLD AUTO: 16.47 THOUSAND/UL (ref 4.31–10.16)
WBC #/AREA URNS AUTO: ABNORMAL /HPF

## 2018-07-17 PROCEDURE — 96375 TX/PRO/DX INJ NEW DRUG ADDON: CPT

## 2018-07-17 PROCEDURE — 85025 COMPLETE CBC W/AUTO DIFF WBC: CPT

## 2018-07-17 PROCEDURE — 80053 COMPREHEN METABOLIC PANEL: CPT

## 2018-07-17 PROCEDURE — 96361 HYDRATE IV INFUSION ADD-ON: CPT

## 2018-07-17 PROCEDURE — 81001 URINALYSIS AUTO W/SCOPE: CPT

## 2018-07-17 PROCEDURE — 99284 EMERGENCY DEPT VISIT MOD MDM: CPT

## 2018-07-17 PROCEDURE — 74176 CT ABD & PELVIS W/O CONTRAST: CPT

## 2018-07-17 PROCEDURE — 96374 THER/PROPH/DIAG INJ IV PUSH: CPT

## 2018-07-17 PROCEDURE — 36415 COLL VENOUS BLD VENIPUNCTURE: CPT

## 2018-07-17 RX ORDER — ONDANSETRON 2 MG/ML
4 INJECTION INTRAMUSCULAR; INTRAVENOUS ONCE
Status: COMPLETED | OUTPATIENT
Start: 2018-07-17 | End: 2018-07-17

## 2018-07-17 RX ORDER — FENTANYL CITRATE 50 UG/ML
25 INJECTION, SOLUTION INTRAMUSCULAR; INTRAVENOUS ONCE
Status: COMPLETED | OUTPATIENT
Start: 2018-07-17 | End: 2018-07-17

## 2018-07-17 RX ORDER — DOCUSATE SODIUM 100 MG/1
100 CAPSULE, LIQUID FILLED ORAL DAILY
Qty: 30 CAPSULE | Refills: 0 | Status: SHIPPED | OUTPATIENT
Start: 2018-07-17 | End: 2019-05-31 | Stop reason: ALTCHOICE

## 2018-07-17 RX ADMIN — ONDANSETRON 4 MG: 2 INJECTION INTRAMUSCULAR; INTRAVENOUS at 19:27

## 2018-07-17 RX ADMIN — FENTANYL CITRATE 25 MCG: 50 INJECTION INTRAMUSCULAR; INTRAVENOUS at 19:53

## 2018-07-17 RX ADMIN — SODIUM CHLORIDE 1000 ML: 0.9 INJECTION, SOLUTION INTRAVENOUS at 19:53

## 2018-07-17 NOTE — ED PROVIDER NOTES
History  Chief Complaint   Patient presents with    Flank Pain     pt c/o right flank pain; pt was seen here on 7/15 for the same symptoms and was diagnoised with a 3mm stone; pt denies any n/v but says she has diarrhea  This is a morbidly obese 75year old female who was seen last week at [facility] and diagnosed with a UTI and placed on cephalexin and finished today  She states she was seen here 7/15/18 and dx with a kidney stone, went home and has not passed it  She states she has been straining urine  She states she now has severe right flank pain that radiates into her right groin  Denies v/hematuria  She sees Dr Heri Mullen  History provided by:  Patient and medical records   used: No        Prior to Admission Medications   Prescriptions Last Dose Informant Patient Reported? Taking? OMEPRAZOLE PO   Yes Yes   Sig: Take 1 tablet by mouth daily   ibuprofen (MOTRIN) 600 mg tablet   No Yes   Sig: Take 1 tablet (600 mg total) by mouth every 6 (six) hours as needed for mild pain   lisinopril (ZESTRIL) 10 mg tablet   Yes Yes   Sig: Take 10 mg by mouth 2 (two) times a day      Facility-Administered Medications: None       Past Medical History:   Diagnosis Date    Cancer ([facility] [address] )     GERD (gastroesophageal reflux disease)     Hypertension        Past Surgical History:   Procedure Laterality Date    EYE SURGERY         History reviewed  No pertinent family history  I have reviewed and agree with the history as documented  Social History   Substance Use Topics    Smoking status: Never Smoker    Smokeless tobacco: Never Used    Alcohol use 1 2 oz/week     2 Glasses of wine per week        Review of Systems   Constitutional: Negative  HENT: Negative  Eyes: Negative  Respiratory: Negative  Cardiovascular: Negative  Gastrointestinal: Positive for abdominal pain and nausea  Endocrine: Negative  Genitourinary: Positive for flank pain  Skin: Negative  Allergic/Immunologic: Negative  Neurological: Negative  Hematological: Negative  Psychiatric/Behavioral: Negative  Physical Exam  Physical Exam   Constitutional: She is oriented to person, place, and time  She appears well-developed and well-nourished  She appears distressed  HENT:   Head: Normocephalic and atraumatic  Eyes: EOM are normal  Pupils are equal, round, and reactive to light  Neck: Normal range of motion  Neck supple  Cardiovascular: Normal rate, regular rhythm and normal heart sounds  Pulmonary/Chest: Effort normal and breath sounds normal    Abdominal: Soft  Bowel sounds are normal  She exhibits no distension  There is tenderness  + right CVA and right lower abdominal pain with palpation    Musculoskeletal: Normal range of motion  Neurological: She is alert and oriented to person, place, and time  Skin: Skin is warm and dry  Capillary refill takes less than 2 seconds  She is not diaphoretic  Psychiatric: She has a normal mood and affect  Her behavior is normal  Judgment and thought content normal    Nursing note and vitals reviewed        Vital Signs  ED Triage Vitals [07/17/18 1900]   Temperature Pulse Respirations Blood Pressure SpO2   97 5 °F (36 4 °C) 93 20 142/69 97 %      Temp Source Heart Rate Source Patient Position - Orthostatic VS BP Location FiO2 (%)   Oral Monitor Sitting Right arm --      Pain Score       9           Vitals:    07/17/18 1900 07/17/18 2107 07/17/18 2205   BP: 142/69 (!) 179/73 (!) 179/74   Pulse: 93 97 92   Patient Position - Orthostatic VS: Sitting Lying Lying       Visual Acuity      ED Medications  Medications   ondansetron (ZOFRAN) injection 4 mg (4 mg Intravenous Given 7/17/18 1927)   sodium chloride 0 9 % bolus 1,000 mL (0 mL Intravenous Stopped 7/17/18 2108)   fentanyl citrate (PF) 100 MCG/2ML 25 mcg (25 mcg Intravenous Given 7/17/18 1953)       Diagnostic Studies  Results Reviewed     Procedure Component Value Units Date/Time Urine Microscopic [[id number]]  (Abnormal) Collected:  07/17/18 2048    Lab Status:  Final result Specimen:  Urine from Urine, Clean Catch Updated:  07/17/18 2134     RBC, UA 2-4 (A) /hpf      WBC, UA 2-4 (A) /hpf      Epithelial Cells Occasional /hpf      Bacteria, UA None Seen /hpf     POCT urinalysis dipstick [[id number]]  (Abnormal) Resulted:  07/17/18 2044    Lab Status:  Final result Specimen:  Urine from Urine, Other Updated:  07/17/18 2044    ED Urine Macroscopic [[id number]]  (Abnormal) Collected:  07/17/18 2048    Lab Status:  Final result Specimen:  Urine Updated:  07/17/18 2042     Color, UA Yellow     Clarity, UA Clear     pH, UA 5 5     Leukocytes, UA Trace (A)     Nitrite, UA Negative     Protein, UA Negative mg/dl      Glucose, UA Negative mg/dl      Ketones, UA Negative mg/dl      Urobilinogen, UA 0 2 E U /dl      Bilirubin, UA Negative     Blood, UA Moderate (A)     Specific [place], UA <=1 005    Narrative:       CLINITEK RESULT    Comprehensive metabolic panel [[id number]]  (Abnormal) Collected:  07/17/18 1927    Lab Status:  Final result Specimen:  Blood from Arm, Right Updated:  07/17/18 1952     Sodium 131 (L) mmol/L      Potassium 4 2 mmol/L      Chloride 96 (L) mmol/L      CO2 28 mmol/L      Anion Gap 7 mmol/L      BUN 19 mg/dL      Creatinine 1 00 mg/dL      Glucose 116 mg/dL      Calcium 9 3 mg/dL      AST 17 U/L      ALT 26 U/L      Alkaline Phosphatase 70 U/L      Total Protein 7 5 g/dL      Albumin 3 7 g/dL      Total Bilirubin 0 42 mg/dL      eGFR 58 ml/min/1 73sq m     Narrative:         National Kidney Disease Education Program recommendations are as follows:  GFR calculation is accurate only with a steady state creatinine  Chronic Kidney disease less than 60 ml/min/1 73 sq  meters  Kidney failure less than 15 ml/min/1 73 sq  meters      CBC and differential [1949]  (Abnormal) Collected:  07/17/18 1927    Lab Status:  Final result Specimen:  Blood from Arm, Right Updated:  07/17/18 1942 WBC 16 47 (H) Thousand/uL      RBC 4 51 Million/uL      Hemoglobin 11 5 g/dL      Hematocrit 37 0 %      MCV 82 fL      MCH 25 5 (L) pg      MCHC 31 1 (L) g/dL      RDW 15 3 (H) %      MPV 10 1 fL      Platelets 675 Thousands/uL      nRBC 0 /100 WBCs      Neutrophils Relative 72 %      Lymphocytes Relative 20 %      Monocytes Relative 8 %      Eosinophils Relative 1 %      Basophils Relative 0 %      Neutrophils Absolute 11 77 (H) Thousands/µL      Lymphocytes Absolute 3 21 Thousands/µL      Monocytes Absolute 1 29 (H) Thousand/µL      Eosinophils Absolute 0 17 Thousand/µL      Basophils Absolute 0 03 Thousands/µL                  CT renal stone study abdomen pelvis without contrast   Final Result by Virginia Adames MD (07/17 2145)      1  Bilateral nonobstructing renal calculi  2   Previously identified 3 mm right UVJ calculus now in the bladder, adjacent to or within the right ureteral orifice  3   Cholelithiasis  4   Hiatal hernia containing much of the gastric fundus  Workstation performed: [id number]                    Procedures  Procedures       Phone Contacts  ED Phone Contact    ED Course  ED Course as of Jul 17 2210   Tue Jul 17, 2018 2047 Urine negative for infection  WBC is most likely resulted from stress  Labs reviewed and discussed with pt  Waiting on CT        2148 IMPRESSION:     1   Bilateral nonobstructing renal calculi  2   Previously identified 3 mm right UVJ calculus now in the bladder, adjacent to or within the right ureteral orifice  3   Cholelithiasis  4   Hiatal hernia containing much of the gastric fundus  2149 CT results reviewed and discussed with pt        2155 Pt is resting comfortably on the bed  All results discussed with pt  Explained stone in bladder which it may have been moving earlier  Pt has norco at home and has been taking it on an empty stomach and it makes her sick   Educated on taking 1/2 every 4 hours with food and colace and take motrin as well  Call urologist first thing in am for follow up  Pt verbalizes understanding of d/c instructions and follow up  Urine culture 7/1/18 negative for infection and today urine shows no infection  WBC most likely from stress  Pt verbalizes understanding of d/c instructions and follow up with PCp and urologist as discussed  MDM  Number of Diagnoses or Management Options  Diagnosis management comments: Differential diagnosis:  Infected kidney stone  Pyelonephritis  Renal colic    Plan  Labs  IVF  CT stone protocol  Pain control    Most likely will need to admit       CT results from 7/15/18   Right ureterovesical junction 3 mm calculus causing mild right hydroureteronephrosis  2   Bilateral renal subcentimeter nonobstructing calculi  3   Cholelithiasis  4   Moderate-sized hiatal hernia           Amount and/or Complexity of Data Reviewed  Clinical lab tests: ordered and reviewed  Tests in the radiology section of CPT®: ordered and reviewed  Review and summarize past medical records: yes      CritCare Time    Disposition  Final diagnoses:   Renal colic on right side   Flank pain - right   Leukocytosis, unspecified type   Hematuria, unspecified type   Hypertension, unspecified type   Obesity     Time reflects when diagnosis was documented in both MDM as applicable and the Disposition within this note     Time User Action Codes Description Comment    7/17/2018  9:42 PM Alana Pyo Add [D81] Renal colic on right side     7/17/2018  9:42 PM Alana Pyo Add [R10 9] Flank pain     7/17/2018  9:42 PM Abbie Boy [R10 9] Flank pain right    7/17/2018  9:42 PM [place] [D72 829] Leukocytosis, unspecified type     7/17/2018  9:43 PM Alana Pyo Add [R31 9] Hematuria, unspecified type     7/17/2018  9:43 PM Alana Pyo Add [I10] Hypertension, unspecified type     7/17/2018  9:43 PM Alana Pyo Add [E66 9] Obesity       ED Disposition     ED Disposition Condition Comment    Discharge  Fátima Paredes discharge to home/self care  Condition at discharge: Good        Follow-up Information     Follow up With Specialties Details Why Contact Info Additional [address], [place] Physician Assistant Schedule an appointment as soon as possible for a visit in 2 days  [address]  [facility] [address] [phone]          you are to call your urologist in the morning for an appointment       [address]  Emergency Department Emergency Medicine  If symptoms worsen [address] [facility]  [phone] [place] ED, [address], [place], [address]          Patient's Medications   Discharge Prescriptions    DOCUSATE SODIUM (COLACE) 100 MG CAPSULE    Take 1 capsule (100 mg total) by mouth daily       Start Date: 7/17/2018 End Date: --       Order Dose: 100 mg       Quantity: 30 capsule    Refills: 0     No discharge procedures on file      ED Provider  Electronically Signed by           Angie Gomes  07/17/18 9029

## 2018-07-18 NOTE — DISCHARGE INSTRUCTIONS
Your stone has moved and is in the bladder  You may pass them  Continue to strain your urine  Call your urologist first thing in the morning   You are to take 1/2 of the hydrocodone, eat and take colace so you do not become constipated  Follow up with your PCP  Increase water intake  Avoid caffeine  Kidney Stones   WHAT YOU NEED TO KNOW:   Kidney stones form in the urinary system when the water and waste in your urine are out of balance  When this happens, certain types of waste crystals separate from the urine  The crystals build up and form kidney stones  You may have 1 or more kidney stones  DISCHARGE INSTRUCTIONS:   Return to the emergency department if:   · You have vomiting that is not relieved by medicine  Contact your healthcare provider if:   · You have a fever  · You have trouble passing urine  · You see blood in your urine  · You have severe pain  · You have any questions or concerns about your condition or care  Medicines:   · NSAIDs , such as ibuprofen, help decrease swelling, pain, and fever  This medicine is available with or without a doctor's order  NSAIDs can cause stomach bleeding or kidney problems in certain people  If you take blood thinner medicine, always ask your healthcare provider if NSAIDs are safe for you  Always read the medicine label and follow directions  · Prescription medicine  may be given  Ask how to take this medicine safely  · Medicines  to balance your electrolytes may be needed  · Take your medicine as directed  Contact your healthcare provider if you think your medicine is not helping or if you have side effects  Tell him or her if you are allergic to any medicine  Keep a list of the medicines, vitamins, and herbs you take  Include the amounts, and when and why you take them  Bring the list or the pill bottles to follow-up visits  Carry your medicine list with you in case of an emergency    Follow up with your healthcare provider as directed: You may need to return for more tests  Write down your questions so you remember to ask them during your visits  Self-care:   · Drink plenty of liquids  Your healthcare provider may tell you to drink at least 8 to 12 (eight-ounce) cups of liquids each day  This helps flush out the kidney stones when you urinate  Water is the best liquid to drink  · Strain your urine every time you go to the bathroom  Urinate through a strainer or a piece of thin cloth to catch the stones  Take the stones to your healthcare provider so they can be sent to the lab for tests  This will help your healthcare providers plan the best treatment for you  · Eat a variety of healthy foods  Healthy foods include fruits, vegetables, whole-grain breads, low-fat dairy products, beans, and fish  You may need to limit how much sodium (salt) or protein you eat  Ask for information about the best foods for you  · Stay active  Your stones may pass more easily by if you stay active  Ask about the best activities for you  After you pass your kidney stones:  Once you have passed your kidney stones, your healthcare provider may  order a 24-hour urine test  Results from a 24-hour urine test will help your healthcare provider plan ways to prevent more stones from forming  If you are told to do a 24-hour test, your healthcare provider will give you more instructions  © 2017 [employer] Information is for End User's use only and may not be sold, redistributed or otherwise used for commercial purposes  All illustrations and images included in CareNotes® are the copyrighted property of [employer]  or [employer]  The above information is an  only  It is not intended as medical advice for individual conditions or treatments  Talk to your doctor, nurse or pharmacist before following any medical regimen to see if it is safe and effective for you    How to Strain Your Urine   WHAT YOU NEED TO KNOW:   Urinate into a strainer (funnel with a fine mesh on the bottom) or glass jar to collect kidney stones  DISCHARGE INSTRUCTIONS:   Medicines:   · Pain medicine: You may be given medicine to take away or decrease pain  Do not wait until the pain is severe before you take your medicine  · NSAIDs:  These medicines decrease swelling, pain, and fever  NSAIDs are available without a doctor's order  Ask which medicine is right for you  Ask how much to take and when to take it  Take as directed  NSAIDs can cause stomach bleeding and kidney problems if not taken correctly  · Nausea medicine: This medicine calms your stomach and prevents or controls vomiting  · Take your medicine as directed  Contact your healthcare provider if you think your medicine is not helping or if you have side effects  Tell him of her if you are allergic to any medicine  Keep a list of the medicines, vitamins, and herbs you take  Include the amounts, and when and why you take them  Bring the list or the pill bottles to follow-up visits  Carry your medicine list with you in case of an emergency  Drink liquids as directed:  Drink about 3 liters of liquids each day, or as directed  That equals about 12 glasses of water or fruit juice  Half of your total daily liquids should be water  Limit coffee, tea, and soda to 2 cups daily  Your urine will be pale and clear if you are drinking enough liquid  Self-care:   · Activity:  Exercise, such as walking, may help decrease your pain  · Avoid heat:  Heat may cause you to sweat, urinate less, and become dehydrated  Follow up with your healthcare provider or urologist as directed:  Write down your questions so you remember to ask them during your visits  Contact your healthcare provider or urologist if:   · You have a fever and chills  · Your urine looks cloudy or has a bad smell  · You have burning pain when you urinate  · You have trouble urinating      · You are vomiting and it does not get better, even after you take medicine  · You have questions or concerns about your condition or care  Return to the emergency department if:   · You are not able to urinate  · You have severe pain in your lower abdomen or side  · Your heart flutters or beats faster than usual   © 2017 [employer] Information is for End User's use only and may not be sold, redistributed or otherwise used for commercial purposes  All illustrations and images included in CareNotes® are the copyrighted property of [employer]  or [employer]  The above information is an  only  It is not intended as medical advice for individual conditions or treatments  Talk to your doctor, nurse or pharmacist before following any medical regimen to see if it is safe and effective for you  Flank Pain   WHAT YOU NEED TO KNOW:   Flank pain is felt in the area below your ribcage and above your hip bones, often in the lower back  Your pain may be dull or so severe that you cannot get comfortable  The pain may stay in one area or radiate to another area  It may worsen and lighten in waves  Flank pain is often a sign of problems with your urinary tract, such as a kidney stone or infection  DISCHARGE INSTRUCTIONS:   Return to the emergency department if:   · You have a fever  · Your heart is fluttering or jumping  · You see blood in your urine  · Your pain radiates into your lower abdomen and genital area  · You have intense pain in your low back next to your spine  · You are much more tired than usual and have no desire to eat  · You have a headache and your muscles jerk  Contact your healthcare provider if:   · You have an upset stomach and are vomiting  · You have to urinate more often, and with urgency  · Your pain worsens or does not improve, and you cannot get comfortable  · You pass a stone when you urinate      · You have questions or concerns about your condition or care  Medicines: The following medicines may be ordered for you:  · Pain medicine  may help decrease or relieve your pain  Do not wait until the pain is severe before you take your medicine  · Antibiotics  may help treat a urinary tract infection caused by bacteria  · Take your medicine as directed  Contact your healthcare provider if you think your medicine is not helping or if you have side effects  Tell him of her if you are allergic to any medicine  Keep a list of the medicines, vitamins, and herbs you take  Include the amounts, and when and why you take them  Bring the list or the pill bottles to follow-up visits  Carry your medicine list with you in case of an emergency  Follow up with your healthcare provider in 1 to 2 days or as directed:  Write down your questions so you remember to ask them during your visits  © 2017 [employer] Information is for End User's use only and may not be sold, redistributed or otherwise used for commercial purposes  All illustrations and images included in CareNotes® are the copyrighted property of [employer]  or [employer]  The above information is an  only  It is not intended as medical advice for individual conditions or treatments  Talk to your doctor, nurse or pharmacist before following any medical regimen to see if it is safe and effective for you

## 2018-07-31 ENCOUNTER — OFFICE VISIT (OUTPATIENT)
Dept: UROLOGY | Facility: MEDICAL CENTER | Age: 68
End: 2018-07-31
Payer: COMMERCIAL

## 2018-07-31 VITALS
WEIGHT: 253.6 LBS | SYSTOLIC BLOOD PRESSURE: 156 MMHG | BODY MASS INDEX: 46.67 KG/M2 | HEIGHT: 62 IN | DIASTOLIC BLOOD PRESSURE: 84 MMHG

## 2018-07-31 DIAGNOSIS — N20.0 CALCULUS OF KIDNEY: Primary | ICD-10-CM

## 2018-07-31 LAB
SL AMB  POCT GLUCOSE, UA: NEGATIVE
SL AMB LEUKOCYTE ESTERASE,UA: ABNORMAL
SL AMB POCT BILIRUBIN,UA: NEGATIVE
SL AMB POCT BLOOD,UA: ABNORMAL
SL AMB POCT CLARITY,UA: ABNORMAL
SL AMB POCT COLOR,UA: YELLOW
SL AMB POCT KETONES,UA: NEGATIVE
SL AMB POCT NITRITE,UA: NEGATIVE
SL AMB POCT PH,UA: 6
SL AMB POCT SPECIFIC GRAVITY,UA: 1.01
SL AMB POCT URINE PROTEIN: NEGATIVE
SL AMB POCT UROBILINOGEN: 0.2

## 2018-07-31 PROCEDURE — 99214 OFFICE O/P EST MOD 30 MIN: CPT | Performed by: UROLOGY

## 2018-07-31 PROCEDURE — 82360 CALCULUS ASSAY QUANT: CPT | Performed by: UROLOGY

## 2018-07-31 PROCEDURE — 81003 URINALYSIS AUTO W/O SCOPE: CPT | Performed by: UROLOGY

## 2018-07-31 RX ORDER — NAPROXEN 500 MG/1
500 TABLET ORAL 2 TIMES DAILY WITH MEALS
Refills: 11 | COMMUNITY
Start: 2018-07-27

## 2018-07-31 NOTE — LETTER
July 31, 2018     Arlin Miles, [address] [facility] [address]    Patient: Nestor Maxwell   YOB: 1950   Date of Visit: 7/31/2018       Dear Dr Mercedes López: Thank you for referring Jay Gipson to me for evaluation  Below are my notes for this consultation  If you have questions, please do not hesitate to call me  I look forward to following your patient along with you  Sincerely,        Sushant Beaulieu MD        CC: No Recipients  Sushant Beaulieu MD  7/31/2018 10:03 AM  Sign at close encounter  Assessment/Plan:    Calculus of kidney   The patient recently passed ureteral stone  It will be sent for chemical analysis  A stone risk profile has been ordered  The patient will return pending the results of the stone risk profile  Diagnoses and all orders for this visit:    Calculus of kidney  -     POCT urine dip auto non-scope  -     Stone analysis  -     Stone risk profile    Other orders  -     metFORMIN (GLUCOPHAGE) 500 mg tablet; Take 500 mg by mouth daily with breakfast  -     naproxen (NAPROSYN) 500 mg tablet; Take 500 mg by mouth 2 (two) times a day with meals          Subjective:      Patient ID: Nestor Maxwell is a 75 y o  female  HPI  Renal calculi:  The patient recently passed a ureteral stone from the right side  Pt went to ER twice for pain  She passed it on July 19  Now feels totally recovered  A CT scan in 2016 showed a residual 1 mm stone on the right and a 2 mm stone on the left  She has several stones in each kidney now  Prior stone was calcium oxalate  The patient was counseled and a low calcium -low oxalate diet and advised to maintain a high fluid intake  A stone risk profile has been ordered however I instructed the patient to wait at least 1 month before submitting the specimen          The following portions of the patient's history were reviewed and updated as appropriate: allergies, current medications, past family history, past medical history, past social history, past surgical history and problem list     Review of Systems   Constitutional: Negative for activity change and fatigue  Respiratory: Negative for shortness of breath and wheezing  Cardiovascular: Negative for chest pain  Gastrointestinal: Negative for abdominal pain  Endocrine:          Non-insulin-dependent diabetes mellitus   Genitourinary: Negative for difficulty urinating, dysuria, frequency, hematuria and urgency  Musculoskeletal: Negative for back pain and gait problem  Skin: Negative  Allergic/Immunologic: Negative  Neurological: Negative  Psychiatric/Behavioral: Negative  Objective:      /84 (BP Location: Left arm, Patient Position: Sitting, Cuff Size: Standard)   Ht 5' 2" (1 575 m)   Wt 115 kg (253 lb 9 6 oz)   BMI 46 38 kg/m²           Physical Exam   Constitutional: She is oriented to person, place, and time  She appears well-developed and well-nourished  Neck: Normal range of motion  Neck supple  Pulmonary/Chest: Effort normal    Neurological: She is alert and oriented to person, place, and time  She has normal reflexes  Psychiatric: She has a normal mood and affect   Her behavior is normal  Judgment and thought content normal

## 2018-07-31 NOTE — ASSESSMENT & PLAN NOTE
The patient recently passed ureteral stone  It will be sent for chemical analysis  A stone risk profile has been ordered  The patient will return pending the results of the stone risk profile

## 2018-07-31 NOTE — PROGRESS NOTES
Assessment/Plan:    Calculus of kidney   The patient recently passed ureteral stone  It will be sent for chemical analysis  A stone risk profile has been ordered  The patient will return pending the results of the stone risk profile  Diagnoses and all orders for this visit:    Calculus of kidney  -     POCT urine dip auto non-scope  -     Stone analysis  -     Stone risk profile    Other orders  -     metFORMIN (GLUCOPHAGE) 500 mg tablet; Take 500 mg by mouth daily with breakfast  -     naproxen (NAPROSYN) 500 mg tablet; Take 500 mg by mouth 2 (two) times a day with meals          Subjective:      Patient ID: Oksana Rinne is a 75 y o  female  HPI  Renal calculi:  The patient recently passed a ureteral stone from the right side  Pt went to ER twice for pain  She passed it on July 19  Now feels totally recovered  A CT scan in 2016 showed a residual 1 mm stone on the right and a 2 mm stone on the left  She has several stones in each kidney now  Prior stone was calcium oxalate  The patient was counseled and a low calcium -low oxalate diet and advised to maintain a high fluid intake  A stone risk profile has been ordered however I instructed the patient to wait at least 1 month before submitting the specimen  The following portions of the patient's history were reviewed and updated as appropriate: allergies, current medications, past family history, past medical history, past social history, past surgical history and problem list     Review of Systems   Constitutional: Negative for activity change and fatigue  Respiratory: Negative for shortness of breath and wheezing  Cardiovascular: Negative for chest pain  Gastrointestinal: Negative for abdominal pain  Endocrine:          Non-insulin-dependent diabetes mellitus   Genitourinary: Negative for difficulty urinating, dysuria, frequency, hematuria and urgency  Musculoskeletal: Negative for back pain and gait problem     Skin: Negative  Allergic/Immunologic: Negative  Neurological: Negative  Psychiatric/Behavioral: Negative  Objective:      /84 (BP Location: Left arm, Patient Position: Sitting, Cuff Size: Standard)   Ht 5' 2" (1 575 m)   Wt 115 kg (253 lb 9 6 oz)   BMI 46 38 kg/m²          Physical Exam   Constitutional: She is oriented to person, place, and time  She appears well-developed and well-nourished  Neck: Normal range of motion  Neck supple  Pulmonary/Chest: Effort normal    Neurological: She is alert and oriented to person, place, and time  She has normal reflexes  Psychiatric: She has a normal mood and affect   Her behavior is normal  Judgment and thought content normal

## 2018-08-07 LAB
CA PHOS MFR STONE: 5 %
CALCIUM OXALATE DIHYDRATE MFR STONE IR: 60 %
COLOR STONE: NORMAL
COM MFR STONE: 35 %
COMMENT-STONE3: NORMAL
COMPOSITION: NORMAL
LABORATORY COMMENT REPORT: NORMAL
NIDUS STONE QL: NORMAL
PHOTO: NORMAL
SIZE STONE: NORMAL MM
STONE ANALYSIS-IMP: NORMAL
WT STONE: 8.9 MG

## 2018-09-25 ENCOUNTER — TELEPHONE (OUTPATIENT)
Dept: UROLOGY | Facility: AMBULATORY SURGERY CENTER | Age: 68
End: 2018-09-25

## 2018-09-25 NOTE — TELEPHONE ENCOUNTER
Patient called has bladder infection would like to speak with the nurse  She also has slight gross hematuria [phone]

## 2018-09-25 NOTE — TELEPHONE ENCOUNTER
Spoke to pt and she stated that Dr Sullivan Common PCP put her on keflex 500mg bid on 9/20 for UTI  She states that still having discomfort with slight blood and cloudy urine  UC is in care everywhere will forward message to Dr Sandeep Doan to advise

## 2018-10-16 ENCOUNTER — OFFICE VISIT (OUTPATIENT)
Dept: UROLOGY | Facility: MEDICAL CENTER | Age: 68
End: 2018-10-16
Payer: COMMERCIAL

## 2018-10-16 VITALS
WEIGHT: 241 LBS | HEIGHT: 62 IN | SYSTOLIC BLOOD PRESSURE: 130 MMHG | BODY MASS INDEX: 44.35 KG/M2 | DIASTOLIC BLOOD PRESSURE: 80 MMHG

## 2018-10-16 DIAGNOSIS — N39.0 URINARY TRACT INFECTION WITHOUT HEMATURIA, SITE UNSPECIFIED: Primary | ICD-10-CM

## 2018-10-16 DIAGNOSIS — N30.00 ACUTE CYSTITIS WITHOUT HEMATURIA: Primary | ICD-10-CM

## 2018-10-16 LAB
SL AMB  POCT GLUCOSE, UA: ABNORMAL
SL AMB LEUKOCYTE ESTERASE,UA: ABNORMAL
SL AMB POCT BILIRUBIN,UA: ABNORMAL
SL AMB POCT BLOOD,UA: ABNORMAL
SL AMB POCT CLARITY,UA: CLEAR
SL AMB POCT COLOR,UA: YELLOW
SL AMB POCT KETONES,UA: ABNORMAL
SL AMB POCT NITRITE,UA: ABNORMAL
SL AMB POCT PH,UA: 6
SL AMB POCT SPECIFIC GRAVITY,UA: 1.02
SL AMB POCT URINE PROTEIN: 30
SL AMB POCT UROBILINOGEN: 0.2

## 2018-10-16 PROCEDURE — 81003 URINALYSIS AUTO W/O SCOPE: CPT | Performed by: UROLOGY

## 2018-10-16 PROCEDURE — 99213 OFFICE O/P EST LOW 20 MIN: CPT | Performed by: UROLOGY

## 2018-10-16 PROCEDURE — 4040F PNEUMOC VAC/ADMIN/RCVD: CPT | Performed by: UROLOGY

## 2018-10-16 PROCEDURE — 87086 URINE CULTURE/COLONY COUNT: CPT | Performed by: UROLOGY

## 2018-10-16 RX ORDER — BROMFENAC SODIUM 0.7 MG/ML
SOLUTION/ DROPS OPHTHALMIC
Refills: 3 | COMMUNITY
Start: 2018-10-02 | End: 2019-05-31 | Stop reason: ALTCHOICE

## 2018-10-16 RX ORDER — NITROFURANTOIN 25; 75 MG/1; MG/1
100 CAPSULE ORAL 2 TIMES DAILY
Qty: 10 CAPSULE | Refills: 1 | Status: SHIPPED | OUTPATIENT
Start: 2018-10-16 | End: 2019-05-31 | Stop reason: ALTCHOICE

## 2018-10-16 NOTE — LETTER
October 18, 2018     [address] [address]    Patient: Teresa Arita   YOB: 1950   Date of Visit: 10/16/2018       Dear Dr Cecille Keys: Thank you for referring Nohemi Scarlet to me for evaluation  Below are my notes for this consultation  If you have questions, please do not hesitate to call me  I look forward to following your patient along with you           Sincerely,        Yesi Maldonado MD        CC: No Recipients

## 2018-10-16 NOTE — ASSESSMENT & PLAN NOTE
The patient is dealing with a 30 urinary tract infection since August   We will switch from cephalexin the Macrobid  I do not think imaging is necessary now  Culture pending

## 2018-10-16 NOTE — PROGRESS NOTES
Assessment/Plan:    Acute cystitis without hematuria  The patient is dealing with a 30 urinary tract infection since August   We will switch from cephalexin the Macrobid  I do not think imaging is necessary now  Culture pending  Diagnoses and all orders for this visit:    Acute cystitis without hematuria  -     nitrofurantoin (MACROBID) 100 mg capsule; Take 1 capsule (100 mg total) by mouth 2 (two) times a day          Subjective:      Patient ID: Arlyn Ames is a 75 y o  female  HPI    Recurrent urinary tract infection : This is her third UTI since August   Cephalexin has been tried twice but did not work  Good hygiene habits  No systemic sx  Pt gets diffuse pelvic pain and burning at the end of voiding  Nocturia every 2 hours, usually 0-1 time  Diabetes has not been harder to control  Pt generally feels well between infections except for the last pair of infections  Pt has been doing all of the right things to take care of herself  The following portions of the patient's history were reviewed and updated as appropriate: allergies, current medications, past family history, past medical history, past social history, past surgical history and problem list     Review of Systems    Constitutional: Negative for activity change and fatigue  Respiratory: Negative for shortness of breath and wheezing  Cardiovascular: Negative for chest pain  Gastrointestinal: Negative for abdominal pain  Endocrine:          Non-insulin-dependent diabetes mellitus   Genitourinary: Negative for difficulty urinating, dysuria, frequency, hematuria and urgency  Musculoskeletal: Negative for back pain and gait problem  Skin: Negative  Allergic/Immunologic: Negative  Neurological: Negative  Psychiatric/Behavioral: Negative  Objective: There were no vitals taken for this visit  Physical Exam   Constitutional: She is oriented to person, place, and time   She appears well-developed and well-nourished  Neck: Normal range of motion  Neck supple  Pulmonary/Chest: Effort normal    Neurological: She is alert and oriented to person, place, and time  She has normal reflexes  Psychiatric: She has a normal mood and affect   Her behavior is normal  Judgment and thought content normal

## 2018-10-16 NOTE — LETTER
October 18, 2018     Suly Lam, [address] [place] [id number]    Patient: Dayana Farris   YOB: 1950   Date of Visit: 10/16/2018       Dear Dr Mario Rosa: Thank you for referring Kevin Stephens to me for evaluation  Below are my notes for this consultation  If you have questions, please do not hesitate to call me  I look forward to following your patient along with you  Sincerely,        Nacho Lott MD        CC: No Recipients  Nacho Lott MD  10/18/2018  1:50 PM  Sign at close encounter  Assessment/Plan:    Urinary tract infection without hematuria     The patient will begin taking Macrobid pending her urine culture  Return as needed  Diagnoses and all orders for this visit:    Urinary tract infection without hematuria, site unspecified  -     POCT urine dip auto non-scope  -     Urine culture    Other orders  -     PROLENSA 0 07 % SOLN; INSTILL 1 DROP EVERY MORNING INTO OPERATIVE EYE STARTING 3 DAYS BEFORE SURGERY AND FOR 3 WEEKS AFTER          Subjective:      Patient ID: Dayana Farris is a 75 y o  female  HPI  Recurrent urinary tract infection:  The patient returns with signs and symptoms of another urinary tract infection  She has had 3 UTIs since July  In her mind, these have been separate events  by intervals of at least a few weeks of relative good health  Urinalysis today shows large blood and a small amount of leukocytes  This is certainly consistent with an emerging infection  The patient describes fever, chills or systemic symptoms with any of these recent infections  Until recently, the patient was not particularly prone to UTIs    The following portions of the patient's history were reviewed and updated as appropriate: allergies, current medications, past family history, past medical history, past social history, past surgical history and problem list     Review of Systems   Constitutional: Negative for activity change and fatigue  Respiratory: Negative for shortness of breath and wheezing  Cardiovascular: Negative for chest pain  Hypertension  Gastrointestinal: Negative for abdominal pain  Genitourinary: Negative for difficulty urinating, dysuria, frequency, hematuria and urgency  Musculoskeletal: Negative for back pain and gait problem  Skin: Negative  Allergic/Immunologic: Negative  Neurological: Negative  Psychiatric/Behavioral: Negative  Objective:      /80   Ht 5' 2" (1 575 m)   Wt 109 kg (241 lb)   BMI 44 08 kg/m²           Physical Exam   Constitutional: She is oriented to person, place, and time  She appears well-developed and well-nourished  Neck: Normal range of motion  Neck supple  Pulmonary/Chest: Effort normal    Neurological: She is alert and oriented to person, place, and time  She has normal reflexes  Psychiatric: She has a normal mood and affect   Her behavior is normal  Judgment and thought content normal

## 2018-10-17 LAB — BACTERIA UR CULT: NORMAL

## 2018-10-18 PROBLEM — N39.0 URINARY TRACT INFECTION WITHOUT HEMATURIA: Status: ACTIVE | Noted: 2018-10-18

## 2018-10-18 NOTE — PROGRESS NOTES
Assessment/Plan:    Urinary tract infection without hematuria     The patient will begin taking Macrobid pending her urine culture  Return as needed  Diagnoses and all orders for this visit:    Urinary tract infection without hematuria, site unspecified  -     POCT urine dip auto non-scope  -     Urine culture    Other orders  -     PROLENSA 0 07 % SOLN; INSTILL 1 DROP EVERY MORNING INTO OPERATIVE EYE STARTING 3 DAYS BEFORE SURGERY AND FOR 3 WEEKS AFTER          Subjective:      Patient ID: Ofelia Garcia is a 75 y o  female  HPI  Recurrent urinary tract infection:  The patient returns with signs and symptoms of another urinary tract infection  She has had 3 UTIs since July  In her mind, these have been separate events  by intervals of at least a few weeks of relative good health  Urinalysis today shows large blood and a small amount of leukocytes  This is certainly consistent with an emerging infection  The patient describes fever, chills or systemic symptoms with any of these recent infections  Until recently, the patient was not particularly prone to UTIs  The following portions of the patient's history were reviewed and updated as appropriate: allergies, current medications, past family history, past medical history, past social history, past surgical history and problem list     Review of Systems   Constitutional: Negative for activity change and fatigue  Respiratory: Negative for shortness of breath and wheezing  Cardiovascular: Negative for chest pain  Hypertension  Gastrointestinal: Negative for abdominal pain  Genitourinary: Negative for difficulty urinating, dysuria, frequency, hematuria and urgency  Musculoskeletal: Negative for back pain and gait problem  Skin: Negative  Allergic/Immunologic: Negative  Neurological: Negative  Psychiatric/Behavioral: Negative            Objective:      /80   Ht 5' 2" (1 575 m)   Wt 109 kg (241 lb)   BMI 44 08 kg/m²          Physical Exam   Constitutional: She is oriented to person, place, and time  She appears well-developed and well-nourished  Neck: Normal range of motion  Neck supple  Pulmonary/Chest: Effort normal    Neurological: She is alert and oriented to person, place, and time  She has normal reflexes  Psychiatric: She has a normal mood and affect   Her behavior is normal  Judgment and thought content normal

## 2018-10-19 ENCOUNTER — TELEPHONE (OUTPATIENT)
Dept: UROLOGY | Facility: MEDICAL CENTER | Age: 68
End: 2018-10-19

## 2018-10-19 ENCOUNTER — TELEPHONE (OUTPATIENT)
Dept: UROLOGY | Facility: AMBULATORY SURGERY CENTER | Age: 68
End: 2018-10-19

## 2018-10-19 NOTE — TELEPHONE ENCOUNTER
----- Message from Colby Camarillo MD sent at 10/19/2018  7:22 AM EDT -----  Urine culture sterile  Inform pt

## 2018-10-19 NOTE — TELEPHONE ENCOUNTER
Pt notified of negative UC and she wants to know why she is still having the burning and the pressure in her bladder  Pt would like to speak to Dr Rogelio Betancur  Pt was offered appts for next week and she wants to see if Dr Rogelio Betancur will call her  Message forwarded to Dr Rogelio Betancur

## 2018-12-01 ENCOUNTER — HOSPITAL ENCOUNTER (EMERGENCY)
Facility: HOSPITAL | Age: 68
Discharge: HOME | End: 2018-12-01
Attending: EMERGENCY MEDICINE | Admitting: EMERGENCY MEDICINE
Payer: COMMERCIAL

## 2018-12-01 ENCOUNTER — APPOINTMENT (EMERGENCY)
Dept: CT IMAGING | Facility: HOSPITAL | Age: 68
End: 2018-12-01
Payer: COMMERCIAL

## 2018-12-01 VITALS
RESPIRATION RATE: 18 BRPM | OXYGEN SATURATION: 100 % | HEART RATE: 82 BPM | SYSTOLIC BLOOD PRESSURE: 161 MMHG | TEMPERATURE: 97.8 F | DIASTOLIC BLOOD PRESSURE: 71 MMHG

## 2018-12-01 DIAGNOSIS — N23 RENAL COLIC ON LEFT SIDE: ICD-10-CM

## 2018-12-01 DIAGNOSIS — N39.0 UTI (URINARY TRACT INFECTION): Primary | ICD-10-CM

## 2018-12-01 DIAGNOSIS — N13.30 HYDRONEPHROSIS OF LEFT KIDNEY: ICD-10-CM

## 2018-12-01 LAB
ALBUMIN SERPL BCP-MCNC: 3.6 G/DL (ref 3.5–5)
ALP SERPL-CCNC: 70 U/L (ref 46–116)
ALT SERPL W P-5'-P-CCNC: 32 U/L (ref 12–78)
ANION GAP SERPL CALCULATED.3IONS-SCNC: 10 MMOL/L (ref 4–13)
AST SERPL W P-5'-P-CCNC: 17 U/L (ref 5–45)
BACTERIA UR QL AUTO: ABNORMAL /HPF
BASOPHILS # BLD AUTO: 0.07 THOUSANDS/ΜL (ref 0–0.1)
BASOPHILS NFR BLD AUTO: 1 % (ref 0–1)
BILIRUB SERPL-MCNC: 0.23 MG/DL (ref 0.2–1)
BILIRUB UR QL STRIP: NEGATIVE
BUN SERPL-MCNC: 36 MG/DL (ref 5–25)
CALCIUM SERPL-MCNC: 9.5 MG/DL (ref 8.3–10.1)
CHLORIDE SERPL-SCNC: 100 MMOL/L (ref 100–108)
CLARITY UR: CLEAR
CO2 SERPL-SCNC: 27 MMOL/L (ref 21–32)
COLOR UR: YELLOW
CREAT SERPL-MCNC: 1.05 MG/DL (ref 0.6–1.3)
EOSINOPHIL # BLD AUTO: 0.25 THOUSAND/ΜL (ref 0–0.61)
EOSINOPHIL NFR BLD AUTO: 2 % (ref 0–6)
ERYTHROCYTE [DISTWIDTH] IN BLOOD BY AUTOMATED COUNT: 15.5 % (ref 11.6–15.1)
GFR SERPL CREATININE-BSD FRML MDRD: 54 ML/MIN/1.73SQ M
GLUCOSE SERPL-MCNC: 129 MG/DL (ref 65–140)
GLUCOSE UR STRIP-MCNC: NEGATIVE MG/DL
HCT VFR BLD AUTO: 39.1 % (ref 34.8–46.1)
HGB BLD-MCNC: 12 G/DL (ref 11.5–15.4)
HGB UR QL STRIP.AUTO: ABNORMAL
IMM GRANULOCYTES # BLD AUTO: 0.03 THOUSAND/UL (ref 0–0.2)
IMM GRANULOCYTES NFR BLD AUTO: 0 % (ref 0–2)
KETONES UR STRIP-MCNC: NEGATIVE MG/DL
LACTATE SERPL-SCNC: 1.1 MMOL/L (ref 0.5–2)
LEUKOCYTE ESTERASE UR QL STRIP: ABNORMAL
LYMPHOCYTES # BLD AUTO: 3.33 THOUSANDS/ΜL (ref 0.6–4.47)
LYMPHOCYTES NFR BLD AUTO: 28 % (ref 14–44)
MCH RBC QN AUTO: 25.8 PG (ref 26.8–34.3)
MCHC RBC AUTO-ENTMCNC: 30.7 G/DL (ref 31.4–37.4)
MCV RBC AUTO: 84 FL (ref 82–98)
MONOCYTES # BLD AUTO: 0.81 THOUSAND/ΜL (ref 0.17–1.22)
MONOCYTES NFR BLD AUTO: 7 % (ref 4–12)
NEUTROPHILS # BLD AUTO: 7.39 THOUSANDS/ΜL (ref 1.85–7.62)
NEUTS SEG NFR BLD AUTO: 62 % (ref 43–75)
NITRITE UR QL STRIP: POSITIVE
NON-SQ EPI CELLS URNS QL MICRO: ABNORMAL /HPF
NRBC BLD AUTO-RTO: 0 /100 WBCS
PH UR STRIP.AUTO: 5.5 [PH] (ref 4.5–8)
PLATELET # BLD AUTO: 335 THOUSANDS/UL (ref 149–390)
PMV BLD AUTO: 10.3 FL (ref 8.9–12.7)
POTASSIUM SERPL-SCNC: 4.5 MMOL/L (ref 3.5–5.3)
PROCALCITONIN SERPL-MCNC: <0.05 NG/ML
PROT SERPL-MCNC: 7.6 G/DL (ref 6.4–8.2)
PROT UR STRIP-MCNC: >=300 MG/DL
RBC # BLD AUTO: 4.65 MILLION/UL (ref 3.81–5.12)
RBC #/AREA URNS AUTO: ABNORMAL /HPF
SODIUM SERPL-SCNC: 137 MMOL/L (ref 136–145)
SP GR UR STRIP.AUTO: >=1.03 (ref 1–1.03)
UROBILINOGEN UR QL STRIP.AUTO: 0.2 E.U./DL
WBC # BLD AUTO: 11.88 THOUSAND/UL (ref 4.31–10.16)
WBC #/AREA URNS AUTO: ABNORMAL /HPF

## 2018-12-01 PROCEDURE — 87040 BLOOD CULTURE FOR BACTERIA: CPT | Performed by: EMERGENCY MEDICINE

## 2018-12-01 PROCEDURE — 83605 ASSAY OF LACTIC ACID: CPT | Performed by: EMERGENCY MEDICINE

## 2018-12-01 PROCEDURE — 96375 TX/PRO/DX INJ NEW DRUG ADDON: CPT

## 2018-12-01 PROCEDURE — 87086 URINE CULTURE/COLONY COUNT: CPT

## 2018-12-01 PROCEDURE — 96365 THER/PROPH/DIAG IV INF INIT: CPT

## 2018-12-01 PROCEDURE — 74176 CT ABD & PELVIS W/O CONTRAST: CPT

## 2018-12-01 PROCEDURE — 99284 EMERGENCY DEPT VISIT MOD MDM: CPT

## 2018-12-01 PROCEDURE — 36415 COLL VENOUS BLD VENIPUNCTURE: CPT | Performed by: EMERGENCY MEDICINE

## 2018-12-01 PROCEDURE — 87077 CULTURE AEROBIC IDENTIFY: CPT

## 2018-12-01 PROCEDURE — 96361 HYDRATE IV INFUSION ADD-ON: CPT

## 2018-12-01 PROCEDURE — 81001 URINALYSIS AUTO W/SCOPE: CPT

## 2018-12-01 PROCEDURE — 87186 SC STD MICRODIL/AGAR DIL: CPT

## 2018-12-01 PROCEDURE — 80053 COMPREHEN METABOLIC PANEL: CPT | Performed by: EMERGENCY MEDICINE

## 2018-12-01 PROCEDURE — 85025 COMPLETE CBC W/AUTO DIFF WBC: CPT | Performed by: EMERGENCY MEDICINE

## 2018-12-01 PROCEDURE — 84145 PROCALCITONIN (PCT): CPT | Performed by: EMERGENCY MEDICINE

## 2018-12-01 RX ORDER — CEPHALEXIN 250 MG/1
500 CAPSULE ORAL EVERY 6 HOURS SCHEDULED
Qty: 80 CAPSULE | Refills: 0 | Status: SHIPPED | OUTPATIENT
Start: 2018-12-01 | End: 2018-12-11

## 2018-12-01 RX ORDER — ONDANSETRON 2 MG/ML
4 INJECTION INTRAMUSCULAR; INTRAVENOUS ONCE
Status: COMPLETED | OUTPATIENT
Start: 2018-12-01 | End: 2018-12-01

## 2018-12-01 RX ORDER — CEFAZOLIN SODIUM 2 G/50ML
2000 SOLUTION INTRAVENOUS ONCE
Status: COMPLETED | OUTPATIENT
Start: 2018-12-01 | End: 2018-12-01

## 2018-12-01 RX ORDER — OXYCODONE HYDROCHLORIDE AND ACETAMINOPHEN 5; 325 MG/1; MG/1
1 TABLET ORAL EVERY 6 HOURS PRN
Qty: 10 TABLET | Refills: 0 | Status: SHIPPED | OUTPATIENT
Start: 2018-12-01 | End: 2018-12-11

## 2018-12-01 RX ORDER — KETOROLAC TROMETHAMINE 30 MG/ML
15 INJECTION, SOLUTION INTRAMUSCULAR; INTRAVENOUS ONCE
Status: COMPLETED | OUTPATIENT
Start: 2018-12-01 | End: 2018-12-01

## 2018-12-01 RX ORDER — MORPHINE SULFATE 4 MG/ML
4 INJECTION, SOLUTION INTRAMUSCULAR; INTRAVENOUS ONCE
Status: DISCONTINUED | OUTPATIENT
Start: 2018-12-01 | End: 2018-12-01 | Stop reason: HOSPADM

## 2018-12-01 RX ADMIN — KETOROLAC TROMETHAMINE 15 MG: 30 INJECTION, SOLUTION INTRAMUSCULAR at 01:55

## 2018-12-01 RX ADMIN — CEFAZOLIN SODIUM 2000 MG: 2 SOLUTION INTRAVENOUS at 01:31

## 2018-12-01 RX ADMIN — SODIUM CHLORIDE 1000 ML: 0.9 INJECTION, SOLUTION INTRAVENOUS at 01:31

## 2018-12-01 RX ADMIN — ONDANSETRON 4 MG: 2 INJECTION INTRAMUSCULAR; INTRAVENOUS at 01:31

## 2018-12-01 NOTE — ED PROVIDER NOTES
After Visit Summary   11/2/2018    Aaron Abreu    MRN: [id number]           Patient Information     Date Of Birth          1974        Visit Information        Provider Department      11/2/2018 12:40 PM Stacie Rolon PA-C [facility]        Today's Diagnoses     Acute chest pain    -  1      Care Instructions      Chest Wall Pain: Costochondritis    The chest pain that you have had today is caused by costochondritis. This condition is caused by an inflammation of the cartilage joining your ribs to your breastbone. It is not caused by heart or lung problems. Your healthcare team has made sure that the chest pain you feel is not from a life threatening cause of chest pain such as heart attack, collapsed lung, blood clot in the lung, tear in the aorta, or esophageal rupture. The inflammation may have been brought on by a blow to the chest, lifting heavy objects, intense exercise, or an illness that made you cough and sneeze a lot. It often occurs during times of emotional stress. It can be painful, but it is not dangerous. It usually goes away in 1 to 2 weeks. But it may happen again. Rarely, a more serious condition may cause symptoms similar to costochondritis. That s why it s important to watch for the warning signs listed below.  Home care  Follow these guidelines when caring for yourself at home:    If you feel that emotional stress is a cause of your condition, try to figure out the sources of that stress. It may not be obvious. Learn ways to deal with the stress in your life. This can include regular exercise, muscle relaxation, meditation, or simply taking time out for yourself.    You may use acetaminophen, ibuprofen, or naproxen to control pain, unless another pain medicine was prescribed. If you have liver or kidney disease or ever had a stomach ulcer, talk with your healthcare provider before using these medicines.    You can also help ease pain by using a hot,  History  Chief Complaint   Patient presents with    Flank Pain     Left flank pain reporting kidney stone because she is familiar with the pain due to having had a number of them  Patient presents with left-sided flank pain that started about an hour ago  Does have a history of kidney stones  States that this feels similar  Does report dysuria  Has also been having some vaginal bleeding which she is being worked up for by her OBGYN  History provided by:  Patient   used: No    Flank Pain   Pain location:  L flank  Pain quality: throbbing    Pain radiates to:  LLQ  Pain severity:  Moderate  Onset quality:  Sudden  Duration:  1 hour  Timing:  Constant  Progression:  Worsening  Chronicity:  Recurrent  Context: awakening from sleep    Context: not recent illness    Relieved by:  Nothing  Worsened by:  Nothing  Associated symptoms: chills, nausea, vaginal bleeding and vomiting    Associated symptoms: no constipation, no diarrhea, no fever and no hematuria        Prior to Admission Medications   Prescriptions Last Dose Informant Patient Reported? Taking?    OMEPRAZOLE PO   Yes No   Sig: Take 1 tablet by mouth daily   PROLENSA 0 07 % SOLN   Yes No   Sig: INSTILL 1 DROP EVERY MORNING INTO OPERATIVE EYE STARTING 3 DAYS BEFORE SURGERY AND FOR 3 WEEKS AFTER   docusate sodium (COLACE) 100 mg capsule   No No   Sig: Take 1 capsule (100 mg total) by mouth daily   ibuprofen (MOTRIN) 600 mg tablet   No No   Sig: Take 1 tablet (600 mg total) by mouth every 6 (six) hours as needed for mild pain   Patient not taking: Reported on 10/16/2018    lisinopril (ZESTRIL) 10 mg tablet   Yes No   Sig: Take 10 mg by mouth 2 (two) times a day   metFORMIN (GLUCOPHAGE) 500 mg tablet   Yes No   Sig: Take 500 mg by mouth daily with breakfast   naproxen (NAPROSYN) 500 mg tablet   Yes No   Sig: Take 500 mg by mouth 2 (two) times a day with meals   nitrofurantoin (MACROBID) 100 mg capsule   No No   Sig: Take 1 capsule (100 mg total) by mouth 2 (two) times a day      Facility-Administered Medications: None       Past Medical History:   Diagnosis Date    Cancer [facility] )     Change in bowel function     Diverticulosis     Elevated PSA     GERD (gastroesophageal reflux disease)     History of radiation therapy     Hypertension     Kidney stone     Malignant [facility]oplasm prostate [facility] )     Mixed hyperlipidemia     Nodular prostate     Renal cyst     Urinary frequency        Past Surgical History:   Procedure Laterality Date    CATARACT EXTRACTION  2010    COLONOSCOPY  2008    INSERTION PROSTATE RADIATION SEED Bilateral 2014    OTHER SURGICAL HISTORY  1980    Excision of "lump" from patient's neck    PILONIDAL CYST EXCISION  1965    PROSTATE BIOPSY Bilateral 2014       Family History   Problem Relation Age of Onset    Alzheimer's disease Father     Diabetes Father      I have reviewed and agree with the history as documented  Social History   Substance Use Topics    Smoking status: Never Smoker    Smokeless tobacco: Never Used    Alcohol use 1 2 oz/week     2 Glasses of wine per week        Review of Systems   Constitutional: Positive for chills  Negative for fever  Gastrointestinal: Positive for abdominal pain, nausea and vomiting  Negative for constipation and diarrhea  Genitourinary: Positive for flank pain and vaginal bleeding  Negative for decreased urine volume and hematuria  Musculoskeletal: Positive for back pain  Skin: Negative for color change and pallor  Allergic/Immunologic: Negative for immunocompromised state  All other systems reviewed and are negative  Physical Exam  Physical Exam   Constitutional: She is oriented to person, place, and time  She appears well-developed and well-nourished  HENT:   Head: Normocephalic and atraumatic  Mouth/Throat: Oropharynx is clear and moist    Eyes: Pupils are equal, round, and reactive to light   Conjunctivae are normal    Neck: Normal range of wet compress or heating pad. Use this with or without a medicated skin cream that helps relieves pain.    Do stretching exercise as advised by your provider.    Take any prescribed medicines as directed.  Follow-up care  Follow up with your healthcare provider, or as advised, if you do not start to get better in the next 2 days.  When to seek medical advice  Call your healthcare provider right away if any of these occur:    A change in the type of pain. Call if it feels different, becomes more serious, lasts longer, or spreads into your shoulder, arm, neck, jaw, or back.    Shortness of breath or pain gets worse when you breathe    Weakness, dizziness, or fainting    Cough with dark-colored sputum (phlegm) or blood    Abdominal pain    Dark red or black stools    Fever of 100.4 F (38 C) or higher, or as directed by your healthcare provider  Date Last Reviewed: 12/1/2016 2000-2017 [employer]. [address] [place] [address]. All rights reserved. This information is not intended as a substitute for professional medical care. Always follow your healthcare professional's instructions.                Follow-ups after your visit        Your next 10 appointments already scheduled     Nov 08, 2018  9:00 AM CST   Nurse Only with LV MA/LPN   [facility]    [address] [facility] [address] [phone]              Who to contact     If you have questions or need follow up information about today's clinic visit or your schedule please contact [facility] directly at [phone].  Normal or non-critical lab and imaging results will be communicated to you by MyChart, letter or phone within 4 business days after the clinic has received the results. If you do not hear from us within 7 days, please contact the clinic through MyChart or phone. If you have a critical or abnormal lab result, we will notify you by phone as soon as  motion  Neck supple  Cardiovascular: Normal rate, regular rhythm, normal heart sounds and intact distal pulses  Exam reveals no friction rub  No murmur heard  Pulmonary/Chest: Effort normal and breath sounds normal  No respiratory distress  She has no wheezes  She has no rales  Abdominal: Soft  She exhibits no distension  There is tenderness in the left lower quadrant  There is CVA tenderness  There is no rebound and no guarding  Musculoskeletal: Normal range of motion  She exhibits no edema, tenderness or deformity  Neurological: She is alert and oriented to person, place, and time  Skin: Skin is warm and dry  Psychiatric: She has a normal mood and affect  Nursing note and vitals reviewed        Vital Signs  ED Triage Vitals [12/01/18 0030]   Temperature Pulse Respirations Blood Pressure SpO2   97 8 °F (36 6 °C) 94 20 (!) 175/72 98 %      Temp Source Heart Rate Source Patient Position - Orthostatic VS BP Location FiO2 (%)   Temporal Monitor Lying Right arm --      Pain Score       Worst Possible Pain           Vitals:    12/01/18 0030 12/01/18 0319   BP: (!) 175/72 161/71   Pulse: 94 82   Patient Position - Orthostatic VS: Lying Sitting       Visual Acuity      ED Medications  Medications   morphine (PF) 4 mg/mL injection 4 mg (4 mg Intravenous Not Given 12/1/18 0136)   sodium chloride 0 9 % bolus 1,000 mL (0 mL Intravenous Stopped 12/1/18 0315)   ondansetron (ZOFRAN) injection 4 mg (4 mg Intravenous Given 12/1/18 0131)   ceFAZolin (ANCEF) IVPB (premix) 2,000 mg (0 mg Intravenous Stopped 12/1/18 0222)   ketorolac (TORADOL) injection 15 mg (15 mg Intravenous Given 12/1/18 0155)       Diagnostic Studies  Results Reviewed     Procedure Component Value Units Date/Time    CBC and differential [[id number]]  (Abnormal) Collected:  12/01/18 0121    Lab Status:  Final result Specimen:  Blood from Arm, Left Updated:  12/01/18 0254     WBC 11 88 (H) Thousand/uL      RBC 4 65 Million/uL      Hemoglobin 12 0 possible.  Submit refill requests through [employer] or call your pharmacy and they will forward the refill request to us. Please allow 3 business days for your refill to be completed.          Additional Information About Your Visit        [employer] Information     [employer] gives you secure access to your electronic health record. If you see a primary care provider, you can also send messages to your care team and make appointments. If you have questions, please call your primary care clinic.  If you do not have a primary care provider, please call [phone] and they will assist you.        Care EveryWhere ID     This is your Care EveryWhere ID. This could be used by other organizations to access your [facility] medical records  [id number]        Your Vitals Were     Pulse Temperature Pulse Oximetry BMI (Body Mass Index)          60 97  F (36.1  C) (Tympanic) 100% 30.54 kg/m2         Blood Pressure from Last 3 Encounters:   11/02/18 128/82   10/25/18 152/90   02/13/17 134/82    Weight from Last 3 Encounters:   11/02/18 219 lb (99.3 kg)   10/25/18 219 lb 8 oz (99.6 kg)   02/13/17 242 lb 8 oz (110 kg)              We Performed the Following     D dimer, quantitative     EKG 12-lead complete w/read - Clinics        Primary Care Provider Office Phone # Fax #    Ramona Ann Aaseby-Aguilera, PA-C [phone] [address] [facility] [address]        Equal Access to Services     BROOKE TOUSSAINT : Hadii devyn ku hadasho Socaliali, waaxda luqadaha, qaybta kaalmada ademichelleyada, gregg fagan. So [facility] [phone].    ATENCIÓN: Si habla español, tiene a victoria disposición servicios gratuitos de asistencia lingüística. Llame al [phone].    We comply with applicable federal civil rights laws and [place] laws. We do not discriminate on the basis of race, color, national origin, age, disability, sex, sexual orientation, or gender identity.            Thank you!     Thank you for choosing  [facility]  for your care. Our goal is always to provide you with excellent care. Hearing back from our patients is one way we can continue to improve our services. Please take a few minutes to complete the written survey that you may receive in the mail after your visit with us. Thank you!             Your Updated Medication List - Protect others around you: Learn how to safely use, store and throw away your medicines at www.disposemymeds.org.          This list is accurate as of 11/2/18  2:50 PM.  Always use your most recent med list.                   Brand Name Dispense Instructions for use Diagnosis    ASPIRIN ADULT LOW DOSE 81 MG EC tablet   Generic drug:  aspirin     30 tablet    Take 1 tablet (81 mg) by mouth daily    Essential hypertension       atorvastatin 10 MG tablet    LIPITOR    90 tablet    Take 1 tablet (10 mg) by mouth daily    Hyperlipidemia LDL goal <130       lisinopril 10 MG tablet    PRINIVIL/ZESTRIL    90 tablet    Take 1 tablet (10 mg) by mouth daily    Essential hypertension       MULTI COMPLETE Caps           omega 3 1000 MG Caps     90 capsule    Take 1 g by mouth daily           g/dL      Hematocrit 39 1 %      MCV 84 fL      MCH 25 8 (L) pg      MCHC 30 7 (L) g/dL      RDW 15 5 (H) %      MPV 10 3 fL      Platelets 223 Thousands/uL      nRBC 0 /100 WBCs      Neutrophils Relative 62 %      Immat GRANS % 0 %      Lymphocytes Relative 28 %      Monocytes Relative 7 %      Eosinophils Relative 2 %      Basophils Relative 1 %      Neutrophils Absolute 7 39 Thousands/µL      Immature Grans Absolute 0 03 Thousand/uL      Lymphocytes Absolute 3 33 Thousands/µL      Monocytes Absolute 0 81 Thousand/µL      Eosinophils Absolute 0 25 Thousand/µL      Basophils Absolute 0 07 Thousands/µL     Lactic acid x2 Q2H [[id number]]  (Normal) Collected:  12/01/18 0120    Lab Status:  Final result Specimen:  Blood from Arm, Right Updated:  12/01/18 0150     LACTIC ACID 1 1 mmol/L     Narrative:         Result may be elevated if tourniquet was used during collection  Comprehensive metabolic panel [[id number]]  (Abnormal) Collected:  12/01/18 0121    Lab Status:  Final result Specimen:  Blood from Arm, Left Updated:  12/01/18 0144     Sodium 137 mmol/L      Potassium 4 5 mmol/L      Chloride 100 mmol/L      CO2 27 mmol/L      ANION GAP 10 mmol/L      BUN 36 (H) mg/dL      Creatinine 1 05 mg/dL      Glucose 129 mg/dL      Calcium 9 5 mg/dL      AST 17 U/L      ALT 32 U/L      Alkaline Phosphatase 70 U/L      Total Protein 7 6 g/dL      Albumin 3 6 g/dL      Total Bilirubin 0 23 mg/dL      eGFR 54 ml/min/1 73sq m     Narrative:         National Kidney Disease Education Program recommendations are as follows:  GFR calculation is accurate only with a steady state creatinine  Chronic Kidney disease less than 60 ml/min/1 73 sq  meters  Kidney failure less than 15 ml/min/1 73 sq  meters  Procalcitonin [[id number]] Collected:  12/01/18 0120    Lab Status: In process Specimen:  Blood from Arm, Left Updated:  12/01/18 0127    Blood culture #1 [[id number]] Collected:  12/01/18 0121    Lab Status:   In process Specimen: Blood from Arm, Left Updated:  12/01/18 0127    Blood culture #2 [[id number]] Collected:  12/01/18 0121    Lab Status: In process Specimen:  Blood from Hand, Right Updated:  12/01/18 0126    Lactic acid x2 Q2H [[id number]]     Lab Status:  No result Specimen:  Blood     Urine Microscopic [[id number]]  (Abnormal) Collected:  12/01/18 0101    Lab Status:  Final result Specimen:  Urine from Urine, Clean Catch Updated:  12/01/18 0104     RBC, UA Innumerable (A) /hpf      WBC, UA Innumerable (A) /hpf      Epithelial Cells Occasional /hpf      Bacteria, UA Moderate (A) /hpf     Urine culture [[id number]] Collected:  12/01/18 0101    Lab Status: In process Specimen:  Urine from Urine, Clean Catch Updated:  12/01/18 0104    POCT urinalysis dipstick [[id number]]  (Abnormal) Resulted:  12/01/18 0048    Lab Status:  Final result Updated:  12/01/18 0048    ED Urine Macroscopic [[id number]]  (Abnormal) Collected:  12/01/18 0101    Lab Status:  Final result Specimen:  Urine Updated:  12/01/18 0047     Color, UA Yellow     Clarity, UA Clear     pH, UA 5 5     Leukocytes, UA Small (A)     Nitrite, UA Positive (A)     Protein, UA >=300 (A) mg/dl      Glucose, UA Negative mg/dl      Ketones, UA Negative mg/dl      Urobilinogen, UA 0 2 E U /dl      Bilirubin, UA Negative     Blood, UA Large (A)     Specific Gravity, UA >=1 030    Narrative:       CLINITEK RESULT                 CT renal stone study abdomen pelvis without contrast   Final Result by Frieda Franco MD (12/01 0200)      1  Bilateral nonobstructing nephrolithiasis  2   Mild left hydronephrosis  No obstructing ureteral calculus is seen  Findings may represent recently passed calculus  Correlate with urinalysis to exclude infection  3   Cholelithiasis without evidence of cholecystitis  4   Moderate hiatal hernia              Workstation performed: [id number]                    Procedures  Procedures       Phone Contacts  ED Phone Contact    ED Course MDM  Number of Diagnoses or Management Options  Hydronephrosis of left kidney: new and requires workup  Renal colic on left side: new and requires workup  UTI (urinary tract infection): new and requires workup  Diagnosis management comments: Patient presented with signs and symptoms of a left-sided kidney stone  Has a history of this  Labs and CT obtained  CT shows hydronephrosis with no obstructing stone  Probable recently passed stone %period% creatinine is 1 01  Baseline is around 0 8  The patient did receive 1 L of IV fluids  Urinalysis does show a UTI  There is no infected stone  The patient felt much better after Toradol and IV fluids  No nausea or vomiting  No fevers  Since patient is doing well and there is no obstructing stone she is safe for discharge home  I explained that if she experiences any back pain, fevers, worsening of her symptoms in general, vomiting that she should return to the emergency department for the possibility of the antibiotics that working and pyelonephritis  She understands and agrees with this plan of care  She will be following up with Urology         Amount and/or Complexity of Data Reviewed  Clinical lab tests: ordered and reviewed  Tests in the radiology section of CPT®: ordered and reviewed  Tests in the medicine section of CPT®: reviewed  Review and summarize past medical records: yes  Independent visualization of images, tracings, or specimens: yes    Patient Progress  Patient progress: stable    CritCare Time    Disposition  Final diagnoses:   UTI (urinary tract infection)   Renal colic on left side   Hydronephrosis of left kidney     Time reflects when diagnosis was documented in both MDM as applicable and the Disposition within this note     Time User Action Codes Description Comment    12/1/2018  3:10 AM Smeriglio, Graylon Crimes Add [N39 0] UTI (urinary tract infection)     12/1/2018  3:11 AM Smeriglio, Graylon Crimes Add [T05] Renal colic on left side     12/1/2018  3:11 AM Rhiannon Garvey Add [N13 30] Hydronephrosis of left kidney       ED Disposition     ED Disposition Condition Comment    Discharge  Harriet Willson discharge to home/self care      Condition at discharge: Good        Follow-up Information     Follow up With Specialties Details Why [address] [facility] Call today To schedule an appointment as soon as you can [place] [phone]  703  [facility], [address] [place] [address] [place], [facility] [place], [address]          Discharge Medication List as of 12/1/2018  3:13 AM      START taking these medications    Details   cephalexin (KEFLEX) 250 mg capsule Take 2 capsules (500 mg total) by mouth every 6 (six) hours for 10 days, Starting Sat 12/1/2018, Until Tue 12/11/2018, Print      oxyCODONE-acetaminophen (PERCOCET) 5-325 mg per tablet Take 1 tablet by mouth every 6 (six) hours as needed for severe pain for up to 10 days Max Daily Amount: 4 tablets, Starting Sat 12/1/2018, Until Tue 12/11/2018, Print         CONTINUE these medications which have NOT CHANGED    Details   docusate sodium (COLACE) 100 mg capsule Take 1 capsule (100 mg total) by mouth daily, Starting Tue 7/17/2018, Print      ibuprofen (MOTRIN) 600 mg tablet Take 1 tablet (600 mg total) by mouth every 6 (six) hours as needed for mild pain, Starting Sun 7/15/2018, Print      lisinopril (ZESTRIL) 10 mg tablet Take 10 mg by mouth 2 (two) times a day, Historical Med      metFORMIN (GLUCOPHAGE) 500 mg tablet Take 500 mg by mouth daily with breakfast, Starting Thu 7/26/2018, Historical Med      naproxen (NAPROSYN) 500 mg tablet Take 500 mg by mouth 2 (two) times a day with meals, Starting Fri 7/27/2018, Historical Med      nitrofurantoin (MACROBID) 100 mg capsule Take 1 capsule (100 mg total) by mouth 2 (two) times a day, Starting Tue 10/16/2018, Normal      OMEPRAZOLE PO Take 1 tablet by mouth daily, Historical Med      PROLENSA 0 07 % SOLN INSTILL 1 DROP EVERY MORNING INTO OPERATIVE EYE STARTING 3 DAYS BEFORE SURGERY AND FOR 3 WEEKS AFTER, Historical Med           No discharge procedures on file      ED Provider  Electronically Signed by           Ba Mahoney DO  12/01/18 5483

## 2018-12-03 LAB — BACTERIA UR CULT: ABNORMAL

## 2018-12-06 LAB
BACTERIA BLD CULT: NORMAL
BACTERIA BLD CULT: NORMAL

## 2019-05-30 RX ORDER — MEDROXYPROGESTERONE ACETATE 10 MG/1
20 TABLET ORAL DAILY
Refills: 5 | COMMUNITY
Start: 2019-04-02 | End: 2019-05-31 | Stop reason: ALTCHOICE

## 2019-05-30 RX ORDER — SULFAMETHOXAZOLE AND TRIMETHOPRIM 800; 160 MG/1; MG/1
TABLET ORAL
Refills: 0 | COMMUNITY
Start: 2019-04-26 | End: 2019-05-31 | Stop reason: ALTCHOICE

## 2019-05-30 RX ORDER — TRAMADOL HYDROCHLORIDE 50 MG/1
50 TABLET ORAL EVERY 6 HOURS PRN
Refills: 0 | COMMUNITY
Start: 2019-02-28 | End: 2019-05-31 | Stop reason: ALTCHOICE

## 2019-05-30 RX ORDER — OMEPRAZOLE 20 MG/1
20 CAPSULE, DELAYED RELEASE ORAL DAILY
Refills: 3 | COMMUNITY
Start: 2019-04-25

## 2019-05-31 ENCOUNTER — OFFICE VISIT (OUTPATIENT)
Dept: UROLOGY | Facility: MEDICAL CENTER | Age: 69
End: 2019-05-31
Payer: COMMERCIAL

## 2019-05-31 VITALS
HEIGHT: 62 IN | HEART RATE: 93 BPM | SYSTOLIC BLOOD PRESSURE: 160 MMHG | WEIGHT: 231 LBS | DIASTOLIC BLOOD PRESSURE: 80 MMHG | BODY MASS INDEX: 42.51 KG/M2

## 2019-05-31 DIAGNOSIS — N30.00 ACUTE CYSTITIS WITHOUT HEMATURIA: Primary | ICD-10-CM

## 2019-05-31 LAB
POST-VOID RESIDUAL VOLUME, ML POC: 24 ML
SL AMB  POCT GLUCOSE, UA: ABNORMAL
SL AMB LEUKOCYTE ESTERASE,UA: ABNORMAL
SL AMB POCT BILIRUBIN,UA: ABNORMAL
SL AMB POCT BLOOD,UA: ABNORMAL
SL AMB POCT CLARITY,UA: CLEAR
SL AMB POCT COLOR,UA: YELLOW
SL AMB POCT KETONES,UA: ABNORMAL
SL AMB POCT NITRITE,UA: ABNORMAL
SL AMB POCT PH,UA: 5.5
SL AMB POCT SPECIFIC GRAVITY,UA: 1.02
SL AMB POCT URINE PROTEIN: ABNORMAL
SL AMB POCT UROBILINOGEN: 0.2

## 2019-05-31 PROCEDURE — 51798 US URINE CAPACITY MEASURE: CPT | Performed by: UROLOGY

## 2019-05-31 PROCEDURE — 99214 OFFICE O/P EST MOD 30 MIN: CPT | Performed by: UROLOGY

## 2019-05-31 PROCEDURE — 81003 URINALYSIS AUTO W/O SCOPE: CPT | Performed by: UROLOGY

## 2019-05-31 RX ORDER — NITROFURANTOIN 25; 75 MG/1; MG/1
100 CAPSULE ORAL
Qty: 30 CAPSULE | Refills: 0 | Status: SHIPPED | OUTPATIENT
Start: 2019-05-31 | End: 2019-08-13

## 2019-05-31 RX ORDER — CYCLOBENZAPRINE HCL 10 MG
TABLET ORAL
COMMUNITY
End: 2019-05-31 | Stop reason: ALTCHOICE

## 2019-05-31 RX ORDER — AMOXICILLIN AND CLAVULANATE POTASSIUM 875; 125 MG/1; MG/1
TABLET, FILM COATED ORAL
COMMUNITY
End: 2019-05-31 | Stop reason: ALTCHOICE

## 2019-05-31 RX ORDER — SCOLOPAMINE TRANSDERMAL SYSTEM 1 MG/1
PATCH, EXTENDED RELEASE TRANSDERMAL
COMMUNITY
End: 2019-05-31 | Stop reason: ALTCHOICE

## 2019-05-31 RX ORDER — ALBUTEROL SULFATE 90 UG/1
AEROSOL, METERED RESPIRATORY (INHALATION)
COMMUNITY
End: 2019-05-31 | Stop reason: ALTCHOICE

## 2019-06-04 ENCOUNTER — TELEPHONE (OUTPATIENT)
Dept: UROLOGY | Facility: MEDICAL CENTER | Age: 69
End: 2019-06-04

## 2019-07-11 ENCOUNTER — OFFICE VISIT (OUTPATIENT)
Dept: UROLOGY | Facility: MEDICAL CENTER | Age: 69
End: 2019-07-11
Payer: COMMERCIAL

## 2019-07-11 VITALS
BODY MASS INDEX: 43.24 KG/M2 | HEIGHT: 62 IN | HEART RATE: 86 BPM | DIASTOLIC BLOOD PRESSURE: 80 MMHG | WEIGHT: 235 LBS | SYSTOLIC BLOOD PRESSURE: 130 MMHG

## 2019-07-11 DIAGNOSIS — Z71.89 OTHER SPECIFIED COUNSELING: ICD-10-CM

## 2019-07-11 DIAGNOSIS — N39.0 URINARY TRACT INFECTION WITHOUT HEMATURIA, SITE UNSPECIFIED: Primary | ICD-10-CM

## 2019-07-11 DIAGNOSIS — R31.29 OTHER MICROSCOPIC HEMATURIA: ICD-10-CM

## 2019-07-11 LAB
SL AMB  POCT GLUCOSE, UA: ABNORMAL
SL AMB LEUKOCYTE ESTERASE,UA: ABNORMAL
SL AMB POCT BILIRUBIN,UA: ABNORMAL
SL AMB POCT BLOOD,UA: ABNORMAL
SL AMB POCT CLARITY,UA: CLEAR
SL AMB POCT COLOR,UA: YELLOW
SL AMB POCT KETONES,UA: ABNORMAL
SL AMB POCT NITRITE,UA: ABNORMAL
SL AMB POCT PH,UA: 5.5
SL AMB POCT SPECIFIC GRAVITY,UA: 1.01
SL AMB POCT URINE PROTEIN: ABNORMAL
SL AMB POCT UROBILINOGEN: 0.2

## 2019-07-11 PROCEDURE — 81003 URINALYSIS AUTO W/O SCOPE: CPT | Performed by: UROLOGY

## 2019-07-11 PROCEDURE — 99214 OFFICE O/P EST MOD 30 MIN: CPT | Performed by: UROLOGY

## 2019-07-11 RX ORDER — SULFAMETHOXAZOLE AND TRIMETHOPRIM 800; 160 MG/1; MG/1
1 TABLET ORAL 2 TIMES DAILY
Qty: 14 TABLET | Refills: 2 | Status: SHIPPED | OUTPATIENT
Start: 2019-07-11 | End: 2019-07-18

## 2019-07-11 NOTE — PROGRESS NOTES
Assessment/Plan:    Urinary tract infection without hematuria  Treat infection with Bactrim, culture pending, cystoscopy in a few weeks  The patient had renal imaging in December as part of a stone workup and in no additional imaging is indicated now  Diagnoses and all orders for this visit:    Urinary tract infection without hematuria, site unspecified  -     Urine culture; Future  -     sulfamethoxazole-trimethoprim (BACTRIM DS) 800-160 mg per tablet; Take 1 tablet by mouth 2 (two) times a day for 7 days    Other microscopic hematuria  -     POCT urine dip auto non-scope    Other specified counseling          Subjective:      Patient ID: Fidencio Hammond is a 70 y o  female  HPI  Pain with voiding:  Pt describes passing a worm-like clot in her urine especially in the morning  The clot is about 2 inches long  Pt has had recurrent infections  Urine culture negative on May 31, 2019  Pt took Macrobid at bedtime for a month which did not relieve dysuria and she is infected again  Pt has had symptoms monthly  I saw her some times and her PCP saw her at others  PVR was 24 cc on May 31, 2019  No systemic sx with UTI's  Accompanied by   The following portions of the patient's history were reviewed and updated as appropriate: allergies, current medications, past family history, past medical history, past social history, past surgical history and problem list     Review of Systems   Constitutional: Negative for activity change and fatigue  Respiratory: Negative for shortness of breath and wheezing  Cardiovascular: Negative for chest pain  Hypertension  Gastrointestinal: Negative for abdominal pain  Genitourinary: Negative for difficulty urinating, dysuria, frequency, hematuria and urgency  Musculoskeletal: Negative for back pain and gait problem  Skin: Negative  Allergic/Immunologic: Negative  Neurological: Negative  Psychiatric/Behavioral: Negative  Objective:      /80   Pulse 86   Ht 5' 2" (1 575 m)   Wt 107 kg (235 lb)   BMI 42 98 kg/m²          Physical Exam   Constitutional: She is oriented to person, place, and time  She appears well-developed and well-nourished  Neck: Normal range of motion  Neck supple  Pulmonary/Chest: Effort normal    Neurological: She is alert and oriented to person, place, and time  She has normal reflexes  Psychiatric: She has a normal mood and affect   Her behavior is normal  Judgment and thought content normal

## 2019-07-11 NOTE — ASSESSMENT & PLAN NOTE
Treat infection with Bactrim, culture pending, cystoscopy in a few weeks  The patient had renal imaging in December as part of a stone workup and in no additional imaging is indicated now

## 2019-08-13 ENCOUNTER — PROCEDURE VISIT (OUTPATIENT)
Dept: UROLOGY | Facility: MEDICAL CENTER | Age: 69
End: 2019-08-13
Payer: COMMERCIAL

## 2019-08-13 VITALS
BODY MASS INDEX: 43.24 KG/M2 | WEIGHT: 235 LBS | SYSTOLIC BLOOD PRESSURE: 146 MMHG | HEIGHT: 62 IN | HEART RATE: 91 BPM | DIASTOLIC BLOOD PRESSURE: 84 MMHG

## 2019-08-13 DIAGNOSIS — Z87.440 HISTORY OF UTI: ICD-10-CM

## 2019-08-13 DIAGNOSIS — R31.29 MICROHEMATURIA: Primary | ICD-10-CM

## 2019-08-13 LAB
SL AMB  POCT GLUCOSE, UA: NEGATIVE
SL AMB LEUKOCYTE ESTERASE,UA: ABNORMAL
SL AMB POCT BILIRUBIN,UA: NEGATIVE
SL AMB POCT BLOOD,UA: ABNORMAL
SL AMB POCT CLARITY,UA: ABNORMAL
SL AMB POCT COLOR,UA: YELLOW
SL AMB POCT KETONES,UA: NEGATIVE
SL AMB POCT NITRITE,UA: NEGATIVE
SL AMB POCT PH,UA: 5.5
SL AMB POCT SPECIFIC GRAVITY,UA: 1.02
SL AMB POCT URINE PROTEIN: NEGATIVE
SL AMB POCT UROBILINOGEN: 0.2

## 2019-08-13 PROCEDURE — 88112 CYTOPATH CELL ENHANCE TECH: CPT | Performed by: PATHOLOGY

## 2019-08-13 PROCEDURE — 99213 OFFICE O/P EST LOW 20 MIN: CPT | Performed by: UROLOGY

## 2019-08-13 PROCEDURE — 81003 URINALYSIS AUTO W/O SCOPE: CPT | Performed by: UROLOGY

## 2019-08-13 PROCEDURE — 52000 CYSTOURETHROSCOPY: CPT | Performed by: UROLOGY

## 2019-08-13 RX ORDER — PHENAZOPYRIDINE HYDROCHLORIDE 200 MG/1
200 TABLET, FILM COATED ORAL
Qty: 30 TABLET | Refills: 2 | Status: SHIPPED | OUTPATIENT
Start: 2019-08-13 | End: 2020-01-23

## 2019-08-13 RX ORDER — MAG HYDROX/ALUMINUM HYD/SIMETH 400-400-40
SUSPENSION, ORAL (FINAL DOSE FORM) ORAL DAILY
COMMUNITY

## 2019-08-13 RX ORDER — FOLIC ACID 0.8 MG
TABLET ORAL DAILY
COMMUNITY

## 2019-08-13 RX ORDER — MULTIVITAMIN
1 CAPSULE ORAL DAILY
COMMUNITY

## 2019-08-13 NOTE — PROGRESS NOTES
Assessment/Plan:    Microhematuria  On cystoscopy, there was an area of mucosal abnormality  This may represent carcinoma in situ  A cytology has been sent  If the cytology is in any way equivocal, the patient should be biopsied  It is this abnormal area that accounts for her dysuria  History of UTI  Feels infected today, but urinalysis does not show infection  It await cytology  Diagnoses and all orders for this visit:    Microhematuria  -     POCT urine dip auto non-scope  -     Cytology, urine    History of UTI  -     phenazopyridine (PYRIDIUM) 200 mg tablet; Take 1 tablet (200 mg total) by mouth 3 (three) times a day with meals    Other orders  -     BIOTIN PO; Take by mouth daily  -     Magnesium 500 MG CAPS; Take by mouth daily  -     Cholecalciferol (VITAMIN D3) 5000 units CAPS; Take by mouth daily  -     Multiple Vitamin (MULTIVITAMIN) capsule; Take 1 capsule by mouth daily          Subjective:      Patient ID: Herman Bowen is a 70 y o  female  HPI  Recurrent urinary tract infection:  Pt has had recurrent infections  Urine culture negative on May 31, 2019  Pt took Macrobid at bedtime for a month which did not relieve dysuria and she is infected again  Pt has had symptoms monthly  I saw her some times and her PCP saw her at others        PVR was 24 cc on May 31, 2019         No systemic sx with UTI's  Gross hematuria:  On 1 occasion, the patient reports passing a worm like clot  The patient returns today for cystoscopy  Procedures  FEMALE RIGID CYSTOSCOPY    Preoperative diagnosis:  Recurrent urinary tract infection, dysuria, hematuria    Postoperative diagnosis:  Same with abnormal area of bladder mucosa (see below)  Operation:  Rigid cystoscopy    Surgeon:  Jamie Arias MD, FACS    Anesthesia:  Local    Procedure:  Patient was brought to the cystoscopy room and positively identified  The patient was prepped and draped in sterile fashion   1% xylocaine jelly was instilled into the urethra  The cystoscope was inserted  Findings are as follows:    Urethra:normal   The bladder neck is normal  Ureteral orifices are in normal  position  The mucosa is normal except for an area on the posterior wall to the left of the midline which shows localized hyperemia and ulceration  This is suspicious for carcinoma in situ but may merely represent on resolved cystitis  There is no trabeculation  Cytology ordered  The cystoscope was removed  The patient tolerated the procedure well  Following additional consultation to review the findings with the patient,s he was discharged from the office in satisfactory condition  Impression:  Localized area of mucosal abnormality, diagnosis to be determined  Plan:  Urine cytology, if equivocal consider biopsy  The following portions of the patient's history were reviewed and updated as appropriate: allergies, current medications, past family history, past medical history, past social history, past surgical history and problem list     Review of Systems      Objective:      /84 (BP Location: Left arm, Patient Position: Sitting, Cuff Size: Standard)   Pulse 91   Ht 5' 2" (1 575 m)   Wt 107 kg (235 lb)   BMI 42 98 kg/m²          Physical Exam   Constitutional: She is oriented to person, place, and time  She appears well-developed and well-nourished  Neck: Normal range of motion  Neck supple  Pulmonary/Chest: Effort normal    Genitourinary:   Genitourinary Comments: Normal introitus  Neurological: She is alert and oriented to person, place, and time  She has normal reflexes  Psychiatric: She has a normal mood and affect   Her behavior is normal  Judgment and thought content normal

## 2019-08-13 NOTE — ASSESSMENT & PLAN NOTE
On cystoscopy, there was an area of mucosal abnormality  This may represent carcinoma in situ  A cytology has been sent  If the cytology is in any way equivocal, the patient should be biopsied  It is this abnormal area that accounts for her dysuria

## 2019-08-13 NOTE — LETTER
August 13, 2019     Lambert Dixon, [address] [place] [id number]    Patient: Fátima Paredes   YOB: 1950   Date of Visit: 8/13/2019       Dear Dr Ashley Jensen: Thank you for referring Javan Gordon to me for evaluation  Below are my notes for this consultation  If you have questions, please do not hesitate to call me  I look forward to following your patient along with you  Sincerely,        Cody Patrick MD        CC: No Recipients  Cody Patrick MD  8/13/2019 10:07 AM  Incomplete  Assessment/Plan:    Microhematuria  On cystoscopy, there was an area of mucosal abnormality  This may represent carcinoma in situ  A cytology has been sent  If the cytology is in any way equivocal, the patient should be biopsied  It is this abnormal area that accounts for her dysuria  History of UTI  Feels infected today, but urinalysis does not show infection  It await cytology  Diagnoses and all orders for this visit:    Microhematuria  -     POCT urine dip auto non-scope  -     Cytology, urine    History of UTI  -     phenazopyridine (PYRIDIUM) 200 mg tablet; Take 1 tablet (200 mg total) by mouth 3 (three) times a day with meals    Other orders  -     BIOTIN PO; Take by mouth daily  -     Magnesium 500 MG CAPS; Take by mouth daily  -     Cholecalciferol (VITAMIN D3) 5000 units CAPS; Take by mouth daily  -     Multiple Vitamin (MULTIVITAMIN) capsule; Take 1 capsule by mouth daily          Subjective:      Patient ID: Fátima Paredes is a 70 y o  female  HPI  Recurrent urinary tract infection:  Pt has had recurrent infections  Urine culture negative on May 31, 2019  Pt took Macrobid at bedtime for a month which did not relieve dysuria and she is infected again  Pt has had symptoms monthly  I saw her some times and her PCP saw her at others        PVR was 24 cc on May 31, 2019         No systemic sx with UTI's        Gross hematuria:  On 1 occasion, the patient reports passing a worm like clot  The patient returns today for cystoscopy  Procedures  FEMALE RIGID CYSTOSCOPY    Preoperative diagnosis:  Recurrent urinary tract infection, dysuria, hematuria    Postoperative diagnosis:  Same with abnormal area of bladder mucosa (see below)  Operation:  Rigid cystoscopy    Surgeon:  Bebo Ronquillo MD, FACS    Anesthesia:  Local    Procedure:  Patient was brought to the cystoscopy room and positively identified  The patient was prepped and draped in sterile fashion  1% xylocaine jelly was instilled into the urethra  The cystoscope was inserted  Findings are as follows:    Urethra:normal   The bladder neck is normal  Ureteral orifices are in normal  position  The mucosa is normal except for an area on the posterior wall to the left of the midline which shows localized hyperemia and ulceration  This is suspicious for carcinoma in situ but may merely represent on resolved cystitis  There is no trabeculation  Cytology ordered  The cystoscope was removed  The patient tolerated the procedure well  Following additional consultation to review the findings with the patient,s he was discharged from the office in satisfactory condition  Impression:  Localized area of mucosal abnormality, diagnosis to be determined  Plan:  Urine cytology, if equivocal consider biopsy  The following portions of the patient's history were reviewed and updated as appropriate: allergies, current medications, past family history, past medical history, past social history, past surgical history and problem list     Review of Systems      Objective:      /84 (BP Location: Left arm, Patient Position: Sitting, Cuff Size: Standard)   Pulse 91   Ht 5' 2" (1 575 m)   Wt 107 kg (235 lb)   BMI 42 98 kg/m²           Physical Exam   Constitutional: She is oriented to person, place, and time  She appears well-developed and well-nourished  Neck: Normal range of motion  Neck supple  Pulmonary/Chest: Effort normal    Genitourinary:   Genitourinary Comments: Normal introitus  Neurological: She is alert and oriented to person, place, and time  She has normal reflexes  Psychiatric: She has a normal mood and affect   Her behavior is normal  Judgment and thought content normal

## 2019-08-15 ENCOUNTER — TELEPHONE (OUTPATIENT)
Dept: UROLOGY | Facility: MEDICAL CENTER | Age: 69
End: 2019-08-15

## 2019-08-15 NOTE — TELEPHONE ENCOUNTER
----- Message from Man Rae MD sent at 8/15/2019  1:45 PM EDT -----  No cancer cells seen in urine cytology  Please inform the patient  Thank you

## 2019-08-16 NOTE — TELEPHONE ENCOUNTER
Pt notified of negative cytology  Questioning why she has pressure and pain when bladder is full and resolves after she urinates  Will direct to Dr Mason Staples for further follow up  Pt currently has no additional appointments scheduled at this time  She is aware he will be returning to the office on 8/20

## 2019-08-26 NOTE — TELEPHONE ENCOUNTER
Left message for pt she is scheduled with Dr Sarah Griffin this Wed at 10:30 to discuss problems  Requested call from pt to verify appointment acceptable

## 2019-08-26 NOTE — TELEPHONE ENCOUNTER
Patient called back and advised that she is having a lot of pain at night time  Patient advised that she is having trouble urinating  Patient would like to know what she can do  Please advise

## 2019-08-28 ENCOUNTER — OFFICE VISIT (OUTPATIENT)
Dept: UROLOGY | Facility: MEDICAL CENTER | Age: 69
End: 2019-08-28
Payer: COMMERCIAL

## 2019-08-28 VITALS
BODY MASS INDEX: 43.43 KG/M2 | HEART RATE: 90 BPM | HEIGHT: 62 IN | SYSTOLIC BLOOD PRESSURE: 120 MMHG | WEIGHT: 236 LBS | DIASTOLIC BLOOD PRESSURE: 70 MMHG

## 2019-08-28 DIAGNOSIS — Z87.440 HISTORY OF UTI: Primary | ICD-10-CM

## 2019-08-28 DIAGNOSIS — R30.0 DYSURIA: ICD-10-CM

## 2019-08-28 DIAGNOSIS — N30.00 ACUTE CYSTITIS WITHOUT HEMATURIA: ICD-10-CM

## 2019-08-28 LAB
SL AMB  POCT GLUCOSE, UA: ABNORMAL
SL AMB LEUKOCYTE ESTERASE,UA: ABNORMAL
SL AMB POCT BILIRUBIN,UA: ABNORMAL
SL AMB POCT BLOOD,UA: ABNORMAL
SL AMB POCT CLARITY,UA: CLEAR
SL AMB POCT COLOR,UA: YELLOW
SL AMB POCT KETONES,UA: ABNORMAL
SL AMB POCT NITRITE,UA: ABNORMAL
SL AMB POCT PH,UA: 5.5
SL AMB POCT SPECIFIC GRAVITY,UA: 1.03
SL AMB POCT URINE PROTEIN: ABNORMAL
SL AMB POCT UROBILINOGEN: 0.2

## 2019-08-28 PROCEDURE — 99215 OFFICE O/P EST HI 40 MIN: CPT | Performed by: UROLOGY

## 2019-08-28 PROCEDURE — 81003 URINALYSIS AUTO W/O SCOPE: CPT | Performed by: UROLOGY

## 2019-08-28 RX ORDER — TOBRAMYCIN AND DEXAMETHASONE 3; 1 MG/ML; MG/ML
1 SUSPENSION/ DROPS OPHTHALMIC 4 TIMES DAILY
Refills: 1 | COMMUNITY
Start: 2019-08-21 | End: 2019-12-10 | Stop reason: ALTCHOICE

## 2019-08-28 RX ORDER — CEFAZOLIN SODIUM 2 G/50ML
2000 SOLUTION INTRAVENOUS ONCE
Status: CANCELLED | OUTPATIENT
Start: 2019-09-11 | End: 2019-08-28

## 2019-08-28 NOTE — H&P
Assessment/Plan:    Dysuria  The patient's symptoms and cystoscopic findings support the diagnoses of interstitial cystitis or carcinoma in situ  Cystoscopy and biopsy planned  Diagnoses and all orders for this visit:    History of UTI  -     POCT urine dip auto non-scope    Dysuria    Acute cystitis without hematuria  -     POCT urine dip auto non-scope  -     Case request operating room: [facility]; Standing  -     Case request operating room: [facility]    Other orders  -     tobramycin-dexamethasone (TOBRADEX) ophthalmic suspension; INSTILL 1 DROP INTO RIGHT EYE 4 TIMES A DAY FOR 14 DAYS  -     Diet NPO; Sips with meds; Standing  -     Place sequential compression device; Standing  -     Comprehensive metabolic panel; Standing  -     CBC and Platelet; Standing  -     Urinalysis; Standing  -     ceFAZolin (ANCEF) 2,000 mg in dextrose 5 % 100 mL IVPB          Subjective:      Patient ID: Elizabeth Guillory is a 70 y o  female  HPI  Change in urinary habits: The patient returns complaining of pain in her urinary bladder when full and she gets some relief when she empties  Nocturia twice  She was initially seen for this on July 11, 2019  A cystoscopy on August 13, 2019 showed an area of bladder hyperemia which was not normal but also not visibly malignant  A cytology was negative  Will plan cysto and biopsies  Never smoked  Some second hand smoke till  quit 30 years ago  Cystoscopy note from August 13, 2019 appears in the indented paragraph below  FEMALE RIGID CYSTOSCOPY     Preoperative diagnosis:  Recurrent urinary tract infection, dysuria, hematuria     Postoperative diagnosis:  Same with abnormal area of bladder mucosa (see below)     Operation:  Rigid cystoscopy     Surgeon:  Richardson Don MD, FACS     Anesthesia:  Local     Procedure:  Patient was brought to the cystoscopy room and positively identified    The patient was prepped and draped in sterile fashion  1% xylocaine jelly was instilled into the urethra        The cystoscope was inserted  Findings are as follows:     Urethra: normal   The bladder neck is normal  Ureteral orifices are in normal  position  The mucosa is normal except for an area on the posterior wall to the left of the midline which shows localized hyperemia and ulceration  This is suspicious for carcinoma in situ but may merely represent unresolved cystitis  There is no trabeculation  Cytology ordered      The cystoscope was removed  The patient tolerated the procedure well  Following additional consultation to review the findings with the patient,s he was discharged from the office in satisfactory condition      Impression:  Localized area of mucosal abnormality, diagnosis to be determined      Plan:  Urine cytology, if equivocal consider biopsy  The following portions of the patient's history were reviewed and updated as appropriate: allergies, current medications, past family history, past medical history, past social history, past surgical history and problem list     Review of Systems   Constitutional: Negative for activity change and fatigue  Respiratory: Negative for shortness of breath and wheezing  Cardiovascular: Negative for chest pain  Hypertension  Gastrointestinal: Negative for abdominal pain  Genitourinary: Negative for difficulty urinating, dysuria, frequency, hematuria and urgency  Musculoskeletal: Negative for back pain and gait problem  Skin: Negative  Allergic/Immunologic: Negative  Neurological: Negative  Psychiatric/Behavioral: Negative  Objective:      /70   Pulse 90   Ht 5' 2" (1 575 m)   Wt 107 kg (236 lb)   BMI 43 16 kg/m²           Physical Exam   Constitutional: She is oriented to person, place, and time  She appears well-developed and well-nourished  HENT:   Head: Normocephalic and atraumatic     Eyes: Pupils are equal, round, and reactive to light  Neck: Normal range of motion  Neck supple  Cardiovascular: Normal rate, regular rhythm and normal heart sounds  Pulmonary/Chest: Effort normal and breath sounds normal    Abdominal: Soft  Bowel sounds are normal  She exhibits no distension  Musculoskeletal: Normal range of motion  Neurological: She is alert and oriented to person, place, and time  She has normal reflexes  Skin: Skin is warm and dry  Psychiatric: She has a normal mood and affect   Her behavior is normal  Judgment and thought content normal

## 2019-08-28 NOTE — H&P (VIEW-ONLY)
Assessment/Plan:    Dysuria  The patient's symptoms and cystoscopic findings support the diagnoses of interstitial cystitis or carcinoma in situ  Cystoscopy and biopsy planned  Diagnoses and all orders for this visit:    History of UTI  -     POCT urine dip auto non-scope    Dysuria    Acute cystitis without hematuria  -     POCT urine dip auto non-scope  -     Case request operating room: [facility]; Standing  -     Case request operating room: [facility]    Other orders  -     tobramycin-dexamethasone (TOBRADEX) ophthalmic suspension; INSTILL 1 DROP INTO RIGHT EYE 4 TIMES A DAY FOR 14 DAYS  -     Diet NPO; Sips with meds; Standing  -     Place sequential compression device; Standing  -     Comprehensive metabolic panel; Standing  -     CBC and Platelet; Standing  -     Urinalysis; Standing  -     ceFAZolin (ANCEF) 2,000 mg in dextrose 5 % 100 mL IVPB          Subjective:      Patient ID: Ismael Robbins is a 70 y o  female  HPI  Change in urinary habits: The patient returns complaining of pain in her urinary bladder when full and she gets some relief when she empties  Nocturia twice  She was initially seen for this on July 11, 2019  A cystoscopy on August 13, 2019 showed an area of bladder hyperemia which was not normal but also not visibly malignant  A cytology was negative  Will plan cysto and biopsies  Never smoked  Some second hand smoke till  quit 30 years ago  Cystoscopy note from August 13, 2019 appears in the indented paragraph below  FEMALE RIGID CYSTOSCOPY     Preoperative diagnosis:  Recurrent urinary tract infection, dysuria, hematuria     Postoperative diagnosis:  Same with abnormal area of bladder mucosa (see below)     Operation:  Rigid cystoscopy     Surgeon:  Trip Medina MD, FACS     Anesthesia:  Local     Procedure:  Patient was brought to the cystoscopy room and positively identified    The patient was prepped and draped in sterile fashion  1% xylocaine jelly was instilled into the urethra        The cystoscope was inserted  Findings are as follows:     Urethra: normal   The bladder neck is normal  Ureteral orifices are in normal  position  The mucosa is normal except for an area on the posterior wall to the left of the midline which shows localized hyperemia and ulceration  This is suspicious for carcinoma in situ but may merely represent unresolved cystitis  There is no trabeculation  Cytology ordered      The cystoscope was removed  The patient tolerated the procedure well  Following additional consultation to review the findings with the patient,s he was discharged from the office in satisfactory condition      Impression:  Localized area of mucosal abnormality, diagnosis to be determined      Plan:  Urine cytology, if equivocal consider biopsy  The following portions of the patient's history were reviewed and updated as appropriate: allergies, current medications, past family history, past medical history, past social history, past surgical history and problem list     Review of Systems   Constitutional: Negative for activity change and fatigue  Respiratory: Negative for shortness of breath and wheezing  Cardiovascular: Negative for chest pain  Hypertension  Gastrointestinal: Negative for abdominal pain  Genitourinary: Negative for difficulty urinating, dysuria, frequency, hematuria and urgency  Musculoskeletal: Negative for back pain and gait problem  Skin: Negative  Allergic/Immunologic: Negative  Neurological: Negative  Psychiatric/Behavioral: Negative  Objective:      /70   Pulse 90   Ht 5' 2" (1 575 m)   Wt 107 kg (236 lb)   BMI 43 16 kg/m²           Physical Exam   Constitutional: She is oriented to person, place, and time  She appears well-developed and well-nourished  HENT:   Head: Normocephalic and atraumatic     Eyes: Pupils are equal, round, and reactive to light  Neck: Normal range of motion  Neck supple  Cardiovascular: Normal rate, regular rhythm and normal heart sounds  Pulmonary/Chest: Effort normal and breath sounds normal    Abdominal: Soft  Bowel sounds are normal  She exhibits no distension  Musculoskeletal: Normal range of motion  Neurological: She is alert and oriented to person, place, and time  She has normal reflexes  Skin: Skin is warm and dry  Psychiatric: She has a normal mood and affect   Her behavior is normal  Judgment and thought content normal

## 2019-08-28 NOTE — ASSESSMENT & PLAN NOTE
The patient's symptoms and cystoscopic findings support the diagnoses of interstitial cystitis or carcinoma in situ  Cystoscopy and biopsy planned

## 2019-08-28 NOTE — H&P
Assessment/Plan:    No problem-specific Assessment & Plan notes found for this encounter  Diagnoses and all orders for this visit:    History of UTI  -     POCT urine dip auto non-scope    Acute cystitis without hematuria  -     POCT urine dip auto non-scope    Other orders  -     tobramycin-dexamethasone (TOBRADEX) ophthalmic suspension; INSTILL 1 DROP INTO RIGHT EYE 4 TIMES A DAY FOR 14 DAYS          Subjective:      Patient ID: Nelson Villalta is a 70 y o  female  HPI  Change in urinary habits: The patient returns complaining of pain in her urinary bladder when full and she gets some relief when she empties  Nocturia twice  She was initially seen for this on July 11, 2019  A cystoscopy on August 13, 2019 showed an area of bladder hyperemia which was not normal but also not visibly malignant  A cytology was negative  Will plan cysto and biopsies  Never smoked  Some second hand smoke till  quit 30 years ago  Cystoscopy note from August 13, 2019 appears in the indented paragraph below  FEMALE RIGID CYSTOSCOPY     Preoperative diagnosis:  Recurrent urinary tract infection, dysuria, hematuria     Postoperative diagnosis:  Same with abnormal area of bladder mucosa (see below)     Operation:  Rigid cystoscopy     Surgeon:  Doris Alston MD, FACS     Anesthesia:  Local     Procedure:  Patient was brought to the cystoscopy room and positively identified  The patient was prepped and draped in sterile fashion  1% xylocaine jelly was instilled into the urethra        The cystoscope was inserted  Findings are as follows:     Urethra: normal   The bladder neck is normal  Ureteral orifices are in normal  position  The mucosa is normal except for an area on the posterior wall to the left of the midline which shows localized hyperemia and ulceration  This is suspicious for carcinoma in situ but may merely represent unresolved cystitis  There is no trabeculation    Cytology ordered      The cystoscope was removed  The patient tolerated the procedure well  Following additional consultation to review the findings with the patient,s he was discharged from the office in satisfactory condition      Impression:  Localized area of mucosal abnormality, diagnosis to be determined      Plan:  Urine cytology, if equivocal consider biopsy  The following portions of the patient's history were reviewed and updated as appropriate: allergies, current medications, past family history, past medical history, past social history, past surgical history and problem list     Review of Systems   Constitutional: Negative for activity change and fatigue  Respiratory: Negative for shortness of breath and wheezing  Cardiovascular: Negative for chest pain  Hypertension  Gastrointestinal: Negative for abdominal pain  Genitourinary: Negative for difficulty urinating, dysuria, frequency, hematuria and urgency  Musculoskeletal: Negative for back pain and gait problem  Skin: Negative  Allergic/Immunologic: Negative  Neurological: Negative  Psychiatric/Behavioral: Negative  Objective:      /70   Pulse 90   Ht 5' 2" (1 575 m)   Wt 107 kg (236 lb)   BMI 43 16 kg/m²           Physical Exam   Constitutional: She is oriented to person, place, and time  She appears well-developed and well-nourished  HENT:   Head: Normocephalic and atraumatic  Eyes: Pupils are equal, round, and reactive to light  Neck: Normal range of motion  Neck supple  Cardiovascular: Normal rate, regular rhythm and normal heart sounds  Pulmonary/Chest: Effort normal and breath sounds normal    Abdominal: Soft  Bowel sounds are normal  She exhibits no distension  Musculoskeletal: Normal range of motion  Neurological: She is alert and oriented to person, place, and time  She has normal reflexes  Skin: Skin is warm and dry  Psychiatric: She has a normal mood and affect   Her behavior is normal  Judgment and thought content normal

## 2019-08-28 NOTE — PROGRESS NOTES
Assessment/Plan:    Dysuria  The patient's symptoms and cystoscopic findings support the diagnoses of interstitial cystitis or carcinoma in situ  Cystoscopy and biopsy planned  Diagnoses and all orders for this visit:    History of UTI  -     POCT urine dip auto non-scope    Dysuria    Acute cystitis without hematuria  -     POCT urine dip auto non-scope  -     Case request operating room: [facility]; Standing  -     Case request operating room: [facility]    Other orders  -     tobramycin-dexamethasone (TOBRADEX) ophthalmic suspension; INSTILL 1 DROP INTO RIGHT EYE 4 TIMES A DAY FOR 14 DAYS  -     Diet NPO; Sips with meds; Standing  -     Place sequential compression device; Standing  -     Comprehensive metabolic panel; Standing  -     CBC and Platelet; Standing  -     Urinalysis; Standing  -     ceFAZolin (ANCEF) 2,000 mg in dextrose 5 % 100 mL IVPB          Subjective:      Patient ID: Francoise Porras is a 70 y o  female  HPI  Change in urinary habits: The patient returns complaining of pain in her urinary bladder when full and she gets some relief when she empties  Nocturia twice  She was initially seen for this on July 11, 2019  A cystoscopy on August 13, 2019 showed an area of bladder hyperemia which was not normal but also not visibly malignant  A cytology was negative  Will plan cysto and biopsies  Never smoked  Some second hand smoke till  quit 30 years ago  Cystoscopy note from August 13, 2019 appears in the indented paragraph below  FEMALE RIGID CYSTOSCOPY     Preoperative diagnosis:  Recurrent urinary tract infection, dysuria, hematuria     Postoperative diagnosis:  Same with abnormal area of bladder mucosa (see below)     Operation:  Rigid cystoscopy     Surgeon:  Vinh Farrell MD, FACS     Anesthesia:  Local     Procedure:  Patient was brought to the cystoscopy room and positively identified    The patient was prepped and draped in sterile fashion  1% xylocaine jelly was instilled into the urethra        The cystoscope was inserted  Findings are as follows:     Urethra: normal   The bladder neck is normal  Ureteral orifices are in normal  position  The mucosa is normal except for an area on the posterior wall to the left of the midline which shows localized hyperemia and ulceration  This is suspicious for carcinoma in situ but may merely represent unresolved cystitis  There is no trabeculation  Cytology ordered      The cystoscope was removed  The patient tolerated the procedure well  Following additional consultation to review the findings with the patient,s he was discharged from the office in satisfactory condition      Impression:  Localized area of mucosal abnormality, diagnosis to be determined      Plan:  Urine cytology, if equivocal consider biopsy  The following portions of the patient's history were reviewed and updated as appropriate: allergies, current medications, past family history, past medical history, past social history, past surgical history and problem list     Review of Systems   Constitutional: Negative for activity change and fatigue  Respiratory: Negative for shortness of breath and wheezing  Cardiovascular: Negative for chest pain  Hypertension  Gastrointestinal: Negative for abdominal pain  Genitourinary: Negative for difficulty urinating, dysuria, frequency, hematuria and urgency  Musculoskeletal: Negative for back pain and gait problem  Skin: Negative  Allergic/Immunologic: Negative  Neurological: Negative  Psychiatric/Behavioral: Negative  Objective:      /70   Pulse 90   Ht 5' 2" (1 575 m)   Wt 107 kg (236 lb)   BMI 43 16 kg/m²          Physical Exam   Constitutional: She is oriented to person, place, and time  She appears well-developed and well-nourished  HENT:   Head: Normocephalic and atraumatic     Eyes: Pupils are equal, round, and reactive to light  Neck: Normal range of motion  Neck supple  Cardiovascular: Normal rate, regular rhythm and normal heart sounds  Pulmonary/Chest: Effort normal and breath sounds normal    Abdominal: Soft  Bowel sounds are normal  She exhibits no distension  Musculoskeletal: Normal range of motion  Neurological: She is alert and oriented to person, place, and time  She has normal reflexes  Skin: Skin is warm and dry  Psychiatric: She has a normal mood and affect   Her behavior is normal  Judgment and thought content normal

## 2019-08-29 ENCOUNTER — TELEPHONE (OUTPATIENT)
Dept: UROLOGY | Facility: MEDICAL CENTER | Age: 69
End: 2019-08-29

## 2019-08-29 NOTE — PRE-PROCEDURE INSTRUCTIONS
Pre-Surgery Instructions:   Medication Instructions    BIOTIN PO Instructed patient per Anesthesia Guidelines   Cholecalciferol (VITAMIN D3) 5000 units CAPS Instructed patient per Anesthesia Guidelines   lisinopril (ZESTRIL) 10 mg tablet Instructed patient per Anesthesia Guidelines   Magnesium 500 MG CAPS Instructed patient per Anesthesia Guidelines   metFORMIN (GLUCOPHAGE) 500 mg tablet Instructed patient per Anesthesia Guidelines   Multiple Vitamin (MULTIVITAMIN) capsule Instructed patient per Anesthesia Guidelines   naproxen (NAPROSYN) 500 mg tablet Instructed patient per Anesthesia Guidelines   omeprazole (PriLOSEC) 20 mg delayed release capsule Instructed patient per Anesthesia Guidelines   tobramycin-dexamethasone (TOBRADEX) ophthalmic suspension Instructed patient per Anesthesia Guidelines  Instructed to take Omeprazole with sip of water the morning of surgery per anesthesia guidelines  No aspirin, NSAIDs, vitamins, or supplements 1 week before surgery

## 2019-08-30 ENCOUNTER — LAB (OUTPATIENT)
Dept: LAB | Facility: MEDICAL CENTER | Age: 69
End: 2019-08-30
Attending: UROLOGY
Payer: COMMERCIAL

## 2019-08-30 ENCOUNTER — CLINICAL SUPPORT (OUTPATIENT)
Dept: URGENT CARE | Facility: MEDICAL CENTER | Age: 69
End: 2019-08-30
Attending: UROLOGY
Payer: COMMERCIAL

## 2019-08-30 DIAGNOSIS — N30.01 ACUTE CYSTITIS WITH HEMATURIA: ICD-10-CM

## 2019-08-30 LAB
ALBUMIN SERPL BCP-MCNC: 4 G/DL (ref 3.5–5)
ALP SERPL-CCNC: 74 U/L (ref 46–116)
ALT SERPL W P-5'-P-CCNC: 18 U/L (ref 12–78)
ANION GAP SERPL CALCULATED.3IONS-SCNC: 7 MMOL/L (ref 4–13)
AST SERPL W P-5'-P-CCNC: 8 U/L (ref 5–45)
BASOPHILS # BLD AUTO: 0.06 THOUSANDS/ΜL (ref 0–0.1)
BASOPHILS NFR BLD AUTO: 1 % (ref 0–1)
BILIRUB SERPL-MCNC: 0.39 MG/DL (ref 0.2–1)
BUN SERPL-MCNC: 19 MG/DL (ref 5–25)
CALCIUM SERPL-MCNC: 9.5 MG/DL (ref 8.3–10.1)
CHLORIDE SERPL-SCNC: 107 MMOL/L (ref 100–108)
CO2 SERPL-SCNC: 27 MMOL/L (ref 21–32)
CREAT SERPL-MCNC: 0.91 MG/DL (ref 0.6–1.3)
EOSINOPHIL # BLD AUTO: 0.15 THOUSAND/ΜL (ref 0–0.61)
EOSINOPHIL NFR BLD AUTO: 2 % (ref 0–6)
ERYTHROCYTE [DISTWIDTH] IN BLOOD BY AUTOMATED COUNT: 15.6 % (ref 11.6–15.1)
GFR SERPL CREATININE-BSD FRML MDRD: 65 ML/MIN/1.73SQ M
GLUCOSE SERPL-MCNC: 133 MG/DL (ref 65–140)
HCT VFR BLD AUTO: 37.2 % (ref 34.8–46.1)
HGB BLD-MCNC: 10.9 G/DL (ref 11.5–15.4)
IMM GRANULOCYTES # BLD AUTO: 0.05 THOUSAND/UL (ref 0–0.2)
IMM GRANULOCYTES NFR BLD AUTO: 1 % (ref 0–2)
LYMPHOCYTES # BLD AUTO: 2.48 THOUSANDS/ΜL (ref 0.6–4.47)
LYMPHOCYTES NFR BLD AUTO: 30 % (ref 14–44)
MCH RBC QN AUTO: 24.1 PG (ref 26.8–34.3)
MCHC RBC AUTO-ENTMCNC: 29.3 G/DL (ref 31.4–37.4)
MCV RBC AUTO: 82 FL (ref 82–98)
MONOCYTES # BLD AUTO: 0.58 THOUSAND/ΜL (ref 0.17–1.22)
MONOCYTES NFR BLD AUTO: 7 % (ref 4–12)
NEUTROPHILS # BLD AUTO: 5.01 THOUSANDS/ΜL (ref 1.85–7.62)
NEUTS SEG NFR BLD AUTO: 59 % (ref 43–75)
NRBC BLD AUTO-RTO: 0 /100 WBCS
PLATELET # BLD AUTO: 340 THOUSANDS/UL (ref 149–390)
PMV BLD AUTO: 10.3 FL (ref 8.9–12.7)
POTASSIUM SERPL-SCNC: 4.2 MMOL/L (ref 3.5–5.3)
PROT SERPL-MCNC: 7.3 G/DL (ref 6.4–8.2)
RBC # BLD AUTO: 4.52 MILLION/UL (ref 3.81–5.12)
SODIUM SERPL-SCNC: 141 MMOL/L (ref 136–145)
WBC # BLD AUTO: 8.33 THOUSAND/UL (ref 4.31–10.16)

## 2019-08-30 PROCEDURE — 36415 COLL VENOUS BLD VENIPUNCTURE: CPT

## 2019-08-30 PROCEDURE — 85025 COMPLETE CBC W/AUTO DIFF WBC: CPT

## 2019-08-30 PROCEDURE — 87086 URINE CULTURE/COLONY COUNT: CPT

## 2019-08-30 PROCEDURE — 80053 COMPREHEN METABOLIC PANEL: CPT

## 2019-08-31 LAB — BACTERIA UR CULT: NORMAL

## 2019-09-03 ENCOUNTER — TELEPHONE (OUTPATIENT)
Dept: UROLOGY | Facility: MEDICAL CENTER | Age: 69
End: 2019-09-03

## 2019-09-03 LAB
ATRIAL RATE: 84 BPM
P AXIS: 57 DEGREES
PR INTERVAL: 180 MS
QRS AXIS: 10 DEGREES
QRSD INTERVAL: 96 MS
QT INTERVAL: 376 MS
QTC INTERVAL: 444 MS
T WAVE AXIS: 49 DEGREES
VENTRICULAR RATE: 84 BPM

## 2019-09-03 PROCEDURE — 93010 ELECTROCARDIOGRAM REPORT: CPT | Performed by: INTERNAL MEDICINE

## 2019-09-03 NOTE — TELEPHONE ENCOUNTER
Please inform patient that the results are normal-urine culture negative, labs look okay  Pt is aware

## 2019-09-10 ENCOUNTER — ANESTHESIA EVENT (OUTPATIENT)
Dept: PERIOP | Facility: HOSPITAL | Age: 69
End: 2019-09-10
Payer: COMMERCIAL

## 2019-09-11 ENCOUNTER — TELEPHONE (OUTPATIENT)
Dept: UROLOGY | Facility: MEDICAL CENTER | Age: 69
End: 2019-09-11

## 2019-09-11 ENCOUNTER — ANESTHESIA (OUTPATIENT)
Dept: PERIOP | Facility: HOSPITAL | Age: 69
End: 2019-09-11
Payer: COMMERCIAL

## 2019-09-11 ENCOUNTER — HOSPITAL ENCOUNTER (OUTPATIENT)
Facility: HOSPITAL | Age: 69
Setting detail: OUTPATIENT SURGERY
Discharge: HOME | End: 2019-09-11
Attending: UROLOGY | Admitting: UROLOGY
Payer: COMMERCIAL

## 2019-09-11 VITALS
HEIGHT: 62 IN | SYSTOLIC BLOOD PRESSURE: 150 MMHG | HEART RATE: 80 BPM | DIASTOLIC BLOOD PRESSURE: 67 MMHG | TEMPERATURE: 97.6 F | WEIGHT: 236 LBS | OXYGEN SATURATION: 98 % | RESPIRATION RATE: 18 BRPM | BODY MASS INDEX: 43.43 KG/M2

## 2019-09-11 DIAGNOSIS — N30.00 ACUTE CYSTITIS WITHOUT HEMATURIA: ICD-10-CM

## 2019-09-11 LAB — GLUCOSE SERPL-MCNC: 124 MG/DL (ref 65–140)

## 2019-09-11 PROCEDURE — 52204 CYSTOSCOPY W/BIOPSY(S): CPT | Performed by: UROLOGY

## 2019-09-11 PROCEDURE — 82948 REAGENT STRIP/BLOOD GLUCOSE: CPT

## 2019-09-11 PROCEDURE — 88305 TISSUE EXAM BY PATHOLOGIST: CPT | Performed by: PATHOLOGY

## 2019-09-11 RX ORDER — SODIUM CHLORIDE 9 MG/ML
125 INJECTION, SOLUTION INTRAVENOUS CONTINUOUS
Status: DISCONTINUED | OUTPATIENT
Start: 2019-09-11 | End: 2019-09-11 | Stop reason: HOSPADM

## 2019-09-11 RX ORDER — PROPOFOL 10 MG/ML
INJECTION, EMULSION INTRAVENOUS AS NEEDED
Status: DISCONTINUED | OUTPATIENT
Start: 2019-09-11 | End: 2019-09-11 | Stop reason: SURG

## 2019-09-11 RX ORDER — CEFAZOLIN SODIUM 2 G/50ML
2000 SOLUTION INTRAVENOUS ONCE
Status: COMPLETED | OUTPATIENT
Start: 2019-09-11 | End: 2019-09-11

## 2019-09-11 RX ORDER — DEXAMETHASONE SODIUM PHOSPHATE 4 MG/ML
INJECTION, SOLUTION INTRA-ARTICULAR; INTRALESIONAL; INTRAMUSCULAR; INTRAVENOUS; SOFT TISSUE AS NEEDED
Status: DISCONTINUED | OUTPATIENT
Start: 2019-09-11 | End: 2019-09-11 | Stop reason: SURG

## 2019-09-11 RX ORDER — PHENAZOPYRIDINE HYDROCHLORIDE 200 MG/1
200 TABLET, FILM COATED ORAL ONCE
Status: COMPLETED | OUTPATIENT
Start: 2019-09-11 | End: 2019-09-11

## 2019-09-11 RX ORDER — ONDANSETRON 2 MG/ML
INJECTION INTRAMUSCULAR; INTRAVENOUS AS NEEDED
Status: DISCONTINUED | OUTPATIENT
Start: 2019-09-11 | End: 2019-09-11 | Stop reason: SURG

## 2019-09-11 RX ORDER — FENTANYL CITRATE/PF 50 MCG/ML
25 SYRINGE (ML) INJECTION
Status: DISCONTINUED | OUTPATIENT
Start: 2019-09-11 | End: 2019-09-11 | Stop reason: HOSPADM

## 2019-09-11 RX ORDER — ALBUTEROL SULFATE 2.5 MG/3ML
2.5 SOLUTION RESPIRATORY (INHALATION) ONCE AS NEEDED
Status: DISCONTINUED | OUTPATIENT
Start: 2019-09-11 | End: 2019-09-11 | Stop reason: HOSPADM

## 2019-09-11 RX ORDER — FENTANYL CITRATE 50 UG/ML
INJECTION, SOLUTION INTRAMUSCULAR; INTRAVENOUS AS NEEDED
Status: DISCONTINUED | OUTPATIENT
Start: 2019-09-11 | End: 2019-09-11 | Stop reason: SURG

## 2019-09-11 RX ORDER — MEPERIDINE HYDROCHLORIDE 50 MG/ML
12.5 INJECTION INTRAMUSCULAR; INTRAVENOUS; SUBCUTANEOUS AS NEEDED
Status: DISCONTINUED | OUTPATIENT
Start: 2019-09-11 | End: 2019-09-11 | Stop reason: HOSPADM

## 2019-09-11 RX ORDER — HYDROCODONE BITARTRATE AND ACETAMINOPHEN 5; 325 MG/1; MG/1
1 TABLET ORAL EVERY 6 HOURS PRN
Status: DISCONTINUED | OUTPATIENT
Start: 2019-09-11 | End: 2019-09-11 | Stop reason: HOSPADM

## 2019-09-11 RX ORDER — MAGNESIUM HYDROXIDE 1200 MG/15ML
LIQUID ORAL AS NEEDED
Status: DISCONTINUED | OUTPATIENT
Start: 2019-09-11 | End: 2019-09-11 | Stop reason: HOSPADM

## 2019-09-11 RX ORDER — SORBITOL 30 G/1000ML
IRRIGANT IRRIGATION AS NEEDED
Status: DISCONTINUED | OUTPATIENT
Start: 2019-09-11 | End: 2019-09-11 | Stop reason: HOSPADM

## 2019-09-11 RX ADMIN — PROPOFOL 170 MG: 10 INJECTION, EMULSION INTRAVENOUS at 07:33

## 2019-09-11 RX ADMIN — ONDANSETRON 4 MG: 2 INJECTION INTRAMUSCULAR; INTRAVENOUS at 07:50

## 2019-09-11 RX ADMIN — DEXAMETHASONE SODIUM PHOSPHATE 4 MG: 4 INJECTION, SOLUTION INTRAMUSCULAR; INTRAVENOUS at 07:33

## 2019-09-11 RX ADMIN — CEFAZOLIN SODIUM 2000 MG: 2 SOLUTION INTRAVENOUS at 07:22

## 2019-09-11 RX ADMIN — FENTANYL CITRATE 50 MCG: 50 INJECTION, SOLUTION INTRAMUSCULAR; INTRAVENOUS at 07:41

## 2019-09-11 RX ADMIN — SODIUM CHLORIDE 125 ML/HR: 0.9 INJECTION, SOLUTION INTRAVENOUS at 06:04

## 2019-09-11 RX ADMIN — LIDOCAINE HYDROCHLORIDE 80 MG: 20 INJECTION, SOLUTION INTRAVENOUS at 07:33

## 2019-09-11 RX ADMIN — PHENAZOPYRIDINE HYDROCHLORIDE 200 MG: 200 TABLET ORAL at 09:19

## 2019-09-11 NOTE — OP NOTE
OPERATIVE REPORT  PATIENT NAME: Lizeth Lawson    :  1950  MRN: [id number]  Pt Location: [facility] OR ROOM 02    SURGERY DATE: 2019    Surgeon(s) and Role:     * Glenn Reese MD - Primary    Preop Diagnosis:  Acute cystitis without hematuria [N30 00], bladder lesion    Post-Op Diagnosis Codes:     * Acute cystitis without hematuria [N30 00] bladder lesion posterior wall    Procedure(s) (LRB):  CYSTO, BIOPSIES (N/A) biopsy and fulguration of posterior bladder wall lesion    Specimen(s):  * No specimens in log *    Estimated Blood Loss:   Minimal    Drains:  * No LDAs found *    Anesthesia Type:   General    Operative Indications:  Acute cystitis without hematuria [N30 00]  Posterior wall bladder lesion    Operative Findings:  Erythematous patch posterior bladder wall    Complications:   None    Procedure and Technique:  41-year-old woman who underwent rigid cystoscopy in the office by Dr Jami Perez and he found hyperemic lesion in the posterior wall the bladder  He has set her up for cystoscopy and bladder biopsy of this lesion  The risks of bleeding infection damage to the bladder been explained she gives informed consent  Patient was brought to the operating room and identified properly  LMA was induced the patient was prepped and draped in dorsal lithotomy position usual fashion  A time-out was performed  Cystoscopy was carried out with a 22 Dominican cystoscopy sheath and 30 degree lens  The bladder was smooth not trabeculated the orifices were orthotopic and intact  There is no papillary tumor but there was an erythematous patch the posterior wall of the bladder  I took 2 cup biopsy of this with the cup biopsy forceps  The samples were sent to pathology  The entire area was fulgurated with the Bugbee electrode  Hemostasis was excellent  The bladder was drained     I was present for the entire procedure and A qualified resident physician was not available    Patient Disposition:  PACU  and extubated and stable    SIGNATURE: Jose Martin Dupree MD  DATE: September 11, 2019  TIME: 7:27 AM

## 2019-09-11 NOTE — ANESTHESIA PREPROCEDURE EVALUATION
Review of Systems/Medical History  Patient summary reviewed  Chart reviewed  No history of anesthetic complications     Cardiovascular  Hyperlipidemia, Hypertension ,    Pulmonary  Negative pulmonary ROS        GI/Hepatic  Negative GI/hepatic ROS   GERD ,        Kidney stones, Genitourinary malignancy ,        Endo/Other  Diabetes type 2 Oral agent,   Obesity    GYN  Negative gynecology ROS          Hematology  Anemia ,     Musculoskeletal  Negative musculoskeletal ROS        Neurology  Negative neurology ROS      Psychology   Negative psychology ROS              Physical Exam    Airway    Mallampati score: IV  TM Distance: >3 FB  Neck ROM: full     Dental       Cardiovascular  Rhythm: irregular, Rate: normal,     Pulmonary  Breath sounds clear to auscultation,     Other Findings        Anesthesia Plan  ASA Score- 2     Anesthesia Type- general with ASA Monitors  Additional Monitors:   Airway Plan: ETT and LMA  Plan Factors-  Patient did not smoke on day of surgery  Induction- intravenous  Postoperative Plan- Plan for postoperative opioid use  Informed Consent- Anesthetic plan and risks discussed with patient

## 2019-09-11 NOTE — DISCHARGE INSTRUCTIONS
Expect to see blood in the urine, and have burning urgency and frequency  Follow-up with Dr Pat Angelucci to go over the results of the biopsy

## 2019-09-11 NOTE — INTERVAL H&P NOTE
H&P reviewed  After examining the patient I find no changes in the patients condition since the H&P had been written      Vitals:    09/11/19 0558   BP: 141/65   Pulse: 88   Resp: 16   Temp: 98 °F (36 7 °C)   SpO2: 96%

## 2019-09-11 NOTE — TELEPHONE ENCOUNTER
----- Message from [facility] sent at 9/11/2019  8:08 AM EDT -----  Routing to clerical to please call and schedule   Thanks   ----- Message -----  From: Lesley Bailon MD  Sent: 9/11/2019   7:31 AM EDT  To: [facility]    Please call patient to follow up with Dr Anibal Rothman in 1 week to go over biopsy results

## 2019-09-17 ENCOUNTER — TELEPHONE (OUTPATIENT)
Dept: UROLOGY | Facility: MEDICAL CENTER | Age: 69
End: 2019-09-17

## 2019-09-17 NOTE — TELEPHONE ENCOUNTER
----- Message from Eriberto Ugalde MD sent at 9/17/2019  2:14 PM EDT -----  Please inform patient that the results show no cancer-just some inflammation  This is good

## 2019-09-25 ENCOUNTER — OFFICE VISIT (OUTPATIENT)
Dept: UROLOGY | Facility: MEDICAL CENTER | Age: 69
End: 2019-09-25
Payer: COMMERCIAL

## 2019-09-25 VITALS
HEIGHT: 62 IN | DIASTOLIC BLOOD PRESSURE: 86 MMHG | BODY MASS INDEX: 43.24 KG/M2 | WEIGHT: 235 LBS | HEART RATE: 90 BPM | SYSTOLIC BLOOD PRESSURE: 162 MMHG

## 2019-09-25 DIAGNOSIS — Z71.89 OTHER SPECIFIED COUNSELING: ICD-10-CM

## 2019-09-25 DIAGNOSIS — N30.20 CHRONIC CYSTITIS: ICD-10-CM

## 2019-09-25 DIAGNOSIS — R31.29 MICROHEMATURIA: Primary | ICD-10-CM

## 2019-09-25 LAB
SL AMB  POCT GLUCOSE, UA: NEGATIVE
SL AMB LEUKOCYTE ESTERASE,UA: ABNORMAL
SL AMB POCT BILIRUBIN,UA: NEGATIVE
SL AMB POCT BLOOD,UA: ABNORMAL
SL AMB POCT CLARITY,UA: ABNORMAL
SL AMB POCT COLOR,UA: ABNORMAL
SL AMB POCT KETONES,UA: ABNORMAL
SL AMB POCT NITRITE,UA: NEGATIVE
SL AMB POCT PH,UA: 5.5
SL AMB POCT SPECIFIC GRAVITY,UA: >=1.03
SL AMB POCT URINE PROTEIN: NEGATIVE
SL AMB POCT UROBILINOGEN: 0.2

## 2019-09-25 PROCEDURE — 81003 URINALYSIS AUTO W/O SCOPE: CPT | Performed by: UROLOGY

## 2019-09-25 PROCEDURE — 99213 OFFICE O/P EST LOW 20 MIN: CPT | Performed by: UROLOGY

## 2019-09-25 NOTE — PROGRESS NOTES
Assessment/Plan:    Chronic cystitis  Pathology benign  Patient relatively asymptomatic  Return p r n  Debbie Blanco Diagnoses and all orders for this visit:    Microhematuria  -     POCT urine dip auto non-scope    Chronic cystitis    Other specified counseling          Subjective:      Patient ID: Oksana Rinne is a 70 y o  female  HPI  Chronic cystitis-interstitial cystitis:  The patient returns for a postprocedure checkup following cystoscopy and bladder biopsy on September 11, 2019  The pathology report revealed denuded epithelium and inflammatory tissue, but no neoplasm  Not taking phenazopyridine but has a few day's worth on hand  Patient is accompanied by her   The following portions of the patient's history were reviewed and updated as appropriate: allergies, current medications, past family history, past medical history, past social history, past surgical history and problem list     Review of Systems   Constitutional: Negative for activity change and fatigue  Respiratory: Negative for shortness of breath and wheezing  Cardiovascular: Negative for chest pain  Gastrointestinal: Negative for abdominal pain  Genitourinary: Negative for difficulty urinating, dysuria, frequency, hematuria and urgency  Musculoskeletal: Negative for back pain and gait problem  Skin: Negative  Allergic/Immunologic: Negative  Neurological: Negative  Psychiatric/Behavioral: Negative  Objective:      /86 (BP Location: Left arm, Patient Position: Sitting, Cuff Size: Standard)   Pulse 90   Ht 5' 2" (1 575 m)   Wt 107 kg (235 lb)   BMI 42 98 kg/m²          Physical Exam   Constitutional: She is oriented to person, place, and time  She appears well-developed and well-nourished  HENT:   Head: Normocephalic and atraumatic  Neck: Normal range of motion  Neck supple  Pulmonary/Chest: Effort normal    Musculoskeletal: Normal range of motion     Neurological: She is alert and oriented to person, place, and time  She has normal reflexes  Psychiatric: She has a normal mood and affect   Her behavior is normal  Judgment and thought content normal

## 2019-12-05 ENCOUNTER — TELEPHONE (OUTPATIENT)
Dept: UROLOGY | Facility: MEDICAL CENTER | Age: 69
End: 2019-12-05

## 2019-12-05 ENCOUNTER — APPOINTMENT (OUTPATIENT)
Dept: LAB | Facility: MEDICAL CENTER | Age: 69
End: 2019-12-05
Attending: UROLOGY
Payer: COMMERCIAL

## 2019-12-05 DIAGNOSIS — N30.01 ACUTE CYSTITIS WITH HEMATURIA: Primary | ICD-10-CM

## 2019-12-05 DIAGNOSIS — N30.01 ACUTE CYSTITIS WITH HEMATURIA: ICD-10-CM

## 2019-12-05 DIAGNOSIS — R30.0 BURNING WITH URINATION: ICD-10-CM

## 2019-12-05 DIAGNOSIS — N39.0 URINARY TRACT INFECTION WITHOUT HEMATURIA, SITE UNSPECIFIED: Primary | ICD-10-CM

## 2019-12-05 LAB
BACTERIA UR QL AUTO: ABNORMAL /HPF
BILIRUB UR QL STRIP: NEGATIVE
CLARITY UR: ABNORMAL
COLOR UR: YELLOW
GLUCOSE UR STRIP-MCNC: NEGATIVE MG/DL
HGB UR QL STRIP.AUTO: ABNORMAL
KETONES UR STRIP-MCNC: NEGATIVE MG/DL
LEUKOCYTE ESTERASE UR QL STRIP: ABNORMAL
NITRITE UR QL STRIP: POSITIVE
NON-SQ EPI CELLS URNS QL MICRO: ABNORMAL /HPF
PH UR STRIP.AUTO: 5.5 [PH]
PROT UR STRIP-MCNC: ABNORMAL MG/DL
RBC #/AREA URNS AUTO: ABNORMAL /HPF
SP GR UR STRIP.AUTO: 1.03 (ref 1–1.03)
UROBILINOGEN UR QL STRIP.AUTO: 0.2 E.U./DL
WBC #/AREA URNS AUTO: ABNORMAL /HPF

## 2019-12-05 PROCEDURE — 87186 SC STD MICRODIL/AGAR DIL: CPT

## 2019-12-05 PROCEDURE — 81001 URINALYSIS AUTO W/SCOPE: CPT

## 2019-12-05 PROCEDURE — 87086 URINE CULTURE/COLONY COUNT: CPT

## 2019-12-05 PROCEDURE — 87077 CULTURE AEROBIC IDENTIFY: CPT

## 2019-12-05 NOTE — TELEPHONE ENCOUNTER
Patient of Dr Harry Whelan seen in the [facility] office  Patient advised that she is having some blood in her urine, burning  Patient would like to know what to do? Please advise

## 2019-12-05 NOTE — TELEPHONE ENCOUNTER
Called pt, she will go for urine sample today  Orders placed in chart  Instructed to increase fluid intake, especially water

## 2019-12-06 RX ORDER — CEPHALEXIN 500 MG/1
500 CAPSULE ORAL EVERY 12 HOURS SCHEDULED
Qty: 14 CAPSULE | Refills: 0 | Status: SHIPPED | OUTPATIENT
Start: 2019-12-06 | End: 2019-12-13

## 2019-12-06 NOTE — TELEPHONE ENCOUNTER
Please call to notify patient that urine testing him back positive for urinary tract infection  I have sent a prescription to the pharmacy on file for her to take to completion

## 2019-12-07 LAB — BACTERIA UR CULT: ABNORMAL

## 2019-12-09 NOTE — TELEPHONE ENCOUNTER
Spoke with pt who is taking the ABX  Gave her u/c results  She has a lot of questions and would like to see Dr Erlinda Barton given with him tomorrow in office

## 2019-12-10 ENCOUNTER — OFFICE VISIT (OUTPATIENT)
Dept: UROLOGY | Facility: MEDICAL CENTER | Age: 69
End: 2019-12-10
Payer: COMMERCIAL

## 2019-12-10 VITALS
WEIGHT: 236 LBS | DIASTOLIC BLOOD PRESSURE: 82 MMHG | HEART RATE: 90 BPM | HEIGHT: 62 IN | SYSTOLIC BLOOD PRESSURE: 130 MMHG | BODY MASS INDEX: 43.43 KG/M2

## 2019-12-10 DIAGNOSIS — N30.01 ACUTE CYSTITIS WITH HEMATURIA: Primary | ICD-10-CM

## 2019-12-10 PROCEDURE — 99214 OFFICE O/P EST MOD 30 MIN: CPT | Performed by: UROLOGY

## 2019-12-10 RX ORDER — CEPHALEXIN 500 MG/1
500 CAPSULE ORAL 4 TIMES DAILY
Qty: 62 CAPSULE | Refills: 0 | Status: SHIPPED | OUTPATIENT
Start: 2019-12-10 | End: 2019-12-13

## 2019-12-10 NOTE — PROGRESS NOTES
Assessment/Plan:    Calculus of kidney  Known retained stones bilaterally  These do not require treatment now  I do not believe they are related to her recurrent infections  Chronic cystitis  The patient has had 2 urinary tract infections this year  Over the last several years she has had several more  Of those which were culture, E coli was the predominant organism however the patient had intervening negative cultures, also  At this point, a plan to place her on Keflex at bedtime for 6 weeks to allow her bladder mucosa to return to normal and he will without threat of another infection  After that, the patient will treat her infections herself with the Keflex I have supplied  She was instructed to call when infected so that we can document his properly  Return p r n  [facility] Diagnoses and all orders for this visit:    Acute cystitis with hematuria  -     cephalexin (KEFLEX) 500 mg capsule; Take 1 capsule (500 mg total) by mouth 4 (four) times a day for 3 days Take one capsule at bedtime for 6 weeks  Reserve the remainder for a future infection   -     Urine culture; Future          Subjective:      Patient ID: Maria Isabel Titus is a 78 y o  female  HPI  Recurrent urinary tract infection:  About 1 week ago, the patient developed symptoms of urinary tract infection  She was placed on Keflex  She felt better within 2-3 days  Culture subsequently grew E coli sensitive to a wide variety of antibiotics  This is her 1st infection in about 3 months  A cystoscopy and bladder biopsy on September 11, 2019 showed denuded cystitis and chronic inflammation  The patient was hospitalized for urosepsis in December 2018  At this point, I would like to place the patient on Keflex 500 mg at bedtime for 6 weeks to allow her bladder mucosa to heal without suffering another infection which will send her back to square 1   I also gave her supply of Keflex to keep on hand with instructions to start the medication 4 times daily if she thinks she is getting infected  The patient was unable to give urine sample today  Renal calculi:  A CT scan in December 2018 showed bilateral punctate nonobstructing renal stones  A few were in the 2-3 mm range on the left side  The following portions of the patient's history were reviewed and updated as appropriate: allergies, current medications, past family history, past medical history, past social history, past surgical history and problem list     Review of Systems   Constitutional: Negative for activity change and fatigue  Respiratory: Negative for shortness of breath and wheezing  Cardiovascular: Negative for chest pain  Hypertension  Gastrointestinal: Negative for abdominal pain  Genitourinary: Negative for difficulty urinating, dysuria, frequency, hematuria and urgency  Musculoskeletal: Negative for back pain and gait problem  Skin: Negative  Allergic/Immunologic: Negative  Neurological: Negative  Psychiatric/Behavioral: Negative  Objective:      /82   Pulse 90   Ht 5' 2" (1 575 m)   Wt 107 kg (236 lb)   BMI 43 16 kg/m²          Physical Exam   Constitutional: She is oriented to person, place, and time  She appears well-developed and well-nourished  HENT:   Head: Normocephalic and atraumatic  Neck: Normal range of motion  Neck supple  Pulmonary/Chest: Effort normal    Musculoskeletal: Normal range of motion  Neurological: She is alert and oriented to person, place, and time  She has normal reflexes  Psychiatric: She has a normal mood and affect   Her behavior is normal  Judgment and thought content normal

## 2019-12-10 NOTE — ASSESSMENT & PLAN NOTE
Known retained stones bilaterally  These do not require treatment now  I do not believe they are related to her recurrent infections

## 2019-12-10 NOTE — LETTER
December 10, 2019     Gwenith Bark, [place] [address] [address]    Patient: Virginia Ramirez   YOB: 1950   Date of Visit: 12/10/2019       Dear Dr Sara Gan: Thank you for referring Francisco English to me for evaluation  Below are my notes for this consultation  If you have questions, please do not hesitate to call me  I look forward to following your patient along with you  Sincerely,        Vidhya Martinez MD        CC: No Recipients  Vidhya Martinez MD  12/10/2019 10:40 AM  Incomplete  Assessment/Plan:    Calculus of kidney  Known retained stones bilaterally  These do not require treatment now  I do not believe they are related to her recurrent infections  Chronic cystitis  The patient has had 2 urinary tract infections this year  Over the last several years she has had several more  Of those which were culture, E coli was the predominant organism however the patient had intervening negative cultures, also  At this point, a plan to place her on Keflex at bedtime for 6 weeks to allow her bladder mucosa to return to normal and he will without threat of another infection  After that, the patient will treat her infections herself with the Keflex I have supplied  She was instructed to call when infected so that we can document his properly  Return obed Calvert Given Diagnoses and all orders for this visit:    Acute cystitis with hematuria  -     cephalexin (KEFLEX) 500 mg capsule; Take 1 capsule (500 mg total) by mouth 4 (four) times a day for 3 days Take one capsule at bedtime for 6 weeks  Reserve the remainder for a future infection   -     Urine culture; Future          Subjective:      Patient ID: Virginia Ramirez is a 78 y o  female  HPI  Recurrent urinary tract infection:  About 1 week ago, the patient developed symptoms of urinary tract infection  She was placed on Keflex  She felt better within 2-3 days    Culture subsequently grew E coli sensitive to a wide variety of antibiotics  This is her 1st infection in about 3 months  A cystoscopy and bladder biopsy on September 11, 2019 showed denuded cystitis and chronic inflammation  The patient was hospitalized for urosepsis in December 2018  At this point, I would like to place the patient on Keflex 500 mg at bedtime for 6 weeks to allow her bladder mucosa to heal without suffering another infection which will send her back to square   I also gave her supply of Keflex to keep on hand with instructions to start the medication 4 times daily if she thinks she is getting infected  The patient was unable to give urine sample today  Renal calculi:  A CT scan in December 2018 showed bilateral punctate nonobstructing renal stones  A few were in the 2-3 mm range on the left side  The following portions of the patient's history were reviewed and updated as appropriate: allergies, current medications, past family history, past medical history, past social history, past surgical history and problem list     Review of Systems   Constitutional: Negative for activity change and fatigue  Respiratory: Negative for shortness of breath and wheezing  Cardiovascular: Negative for chest pain  Hypertension  Gastrointestinal: Negative for abdominal pain  Genitourinary: Negative for difficulty urinating, dysuria, frequency, hematuria and urgency  Musculoskeletal: Negative for back pain and gait problem  Skin: Negative  Allergic/Immunologic: Negative  Neurological: Negative  Psychiatric/Behavioral: Negative  Objective:      /82   Pulse 90   Ht 5' 2" (1 575 m)   Wt 107 kg (236 lb)   BMI 43 16 kg/m²           Physical Exam   Constitutional: She is oriented to person, place, and time  She appears well-developed and well-nourished  HENT:   Head: Normocephalic and atraumatic  Neck: Normal range of motion  Neck supple     Pulmonary/Chest: Effort normal  Musculoskeletal: Normal range of motion  Neurological: She is alert and oriented to person, place, and time  She has normal reflexes  Psychiatric: She has a normal mood and affect   Her behavior is normal  Judgment and thought content normal

## 2019-12-10 NOTE — ASSESSMENT & PLAN NOTE
The patient has had 2 urinary tract infections this year  Over the last several years she has had several more  Of those which were culture, E coli was the predominant organism however the patient had intervening negative cultures, also  At this point, a plan to place her on Keflex at bedtime for 6 weeks to allow her bladder mucosa to return to normal and he will without threat of another infection  After that, the patient will treat her infections herself with the Keflex I have supplied  She was instructed to call when infected so that we can document his properly  Return p sruthi Landers

## 2019-12-13 ENCOUNTER — TELEPHONE (OUTPATIENT)
Dept: UROLOGY | Facility: MEDICAL CENTER | Age: 69
End: 2019-12-13

## 2019-12-13 NOTE — TELEPHONE ENCOUNTER
Call placed to [facility] pharmacy and spoke with Pharmacist who took verbal order to dispense Keflex as per Dr Timoteo Mullins printed prescription order  Call placed to patient and informed her that medication refill is being processed and advised her to call the pharmacy prior to  to ensure medication is ready  Patient verbalized understanding

## 2019-12-13 NOTE — TELEPHONE ENCOUNTER
Patient of Dr Megan Nevarez seen in the [facility] office  Patient called in and advised that he medication was not at the pharmacy  When reviewing the medication it looks like it was printed instead of escribed  Please advise

## 2020-01-23 ENCOUNTER — OFFICE VISIT (OUTPATIENT)
Dept: UROLOGY | Facility: MEDICAL CENTER | Age: 70
End: 2020-01-23
Payer: COMMERCIAL

## 2020-01-23 VITALS
SYSTOLIC BLOOD PRESSURE: 126 MMHG | HEIGHT: 62 IN | DIASTOLIC BLOOD PRESSURE: 82 MMHG | WEIGHT: 235 LBS | HEART RATE: 92 BPM | BODY MASS INDEX: 43.24 KG/M2

## 2020-01-23 DIAGNOSIS — N30.20 CHRONIC CYSTITIS: Primary | ICD-10-CM

## 2020-01-23 DIAGNOSIS — N30.01 ACUTE CYSTITIS WITH HEMATURIA: ICD-10-CM

## 2020-01-23 PROBLEM — N30.00 ACUTE CYSTITIS WITHOUT HEMATURIA: Status: RESOLVED | Noted: 2018-10-16 | Resolved: 2020-01-23

## 2020-01-23 LAB
SL AMB  POCT GLUCOSE, UA: NEGATIVE
SL AMB LEUKOCYTE ESTERASE,UA: ABNORMAL
SL AMB POCT BILIRUBIN,UA: NEGATIVE
SL AMB POCT BLOOD,UA: ABNORMAL
SL AMB POCT CLARITY,UA: CLEAR
SL AMB POCT COLOR,UA: YELLOW
SL AMB POCT KETONES,UA: NEGATIVE
SL AMB POCT NITRITE,UA: NEGATIVE
SL AMB POCT PH,UA: 6
SL AMB POCT SPECIFIC GRAVITY,UA: 1.02
SL AMB POCT URINE PROTEIN: ABNORMAL
SL AMB POCT UROBILINOGEN: 0.2

## 2020-01-23 PROCEDURE — 99214 OFFICE O/P EST MOD 30 MIN: CPT | Performed by: UROLOGY

## 2020-01-23 PROCEDURE — 81003 URINALYSIS AUTO W/O SCOPE: CPT | Performed by: UROLOGY

## 2020-01-23 RX ORDER — CEPHALEXIN 500 MG/1
CAPSULE ORAL
Qty: 186 CAPSULE | Refills: 1 | Status: SHIPPED | OUTPATIENT
Start: 2020-01-23 | End: 2020-04-23

## 2020-01-23 RX ORDER — CEPHALEXIN 500 MG/1
CAPSULE ORAL
COMMUNITY
Start: 2019-12-13 | End: 2021-02-12

## 2020-01-23 NOTE — ASSESSMENT & PLAN NOTE
Resume Keflex at bedtime for 3 months since this worked well for the patient  The patient was given additional cephalexin to keep on hand to treat an acute infection

## 2020-01-23 NOTE — PROGRESS NOTES
Assessment/Plan:    Chronic cystitis  Resume Keflex at bedtime for 3 months since this worked well for the patient  The patient was given additional cephalexin to keep on hand to treat an acute infection  Diagnoses and all orders for this visit:    Chronic cystitis    Acute cystitis with hematuria  -     POCT urine dip auto non-scope  -     cephalexin (KEFLEX) 500 mg capsule; Take daily at bedtime  If infected, take 4 times daily for 7 days  Other orders  -     cephalexin (KEFLEX) 500 mg capsule; PLEASE SEE ATTACHED FOR DETAILED DIRECTIONS          Subjective:      Patient ID: Siva Robles is a 78 y o  female  HPI  Chronic (interstitial) cystitis:  Dysuria is her presenting symptom  Pt treated herself a week or so ago  She was fine but relapsed in 3 days  Retreated  No systemic sx  The patient had no breakthrough in fractions during  the 6 weeks while on cephalexin HS  Cystoscopy on September 11, 2019 showed it denuded cystitis and chronic inflammation  She was placed on Keflex 500 mg at bedtime for 6 weeks and given a supply to keep on hand for treatment of an acute infection  The following portions of the patient's history were reviewed and updated as appropriate: allergies, current medications, past family history, past medical history, past social history, past surgical history and problem list     Review of Systems   Constitutional: Negative for activity change and fatigue  Respiratory: Negative for shortness of breath and wheezing  Cardiovascular: Negative for chest pain  Hypertension  Gastrointestinal: Negative for abdominal pain  Endocrine:        Non-insulin dependent diabetes  Well controlled  Genitourinary: Negative for difficulty urinating, dysuria, frequency, hematuria and urgency  Musculoskeletal: Negative for back pain and gait problem  Skin: Negative  Allergic/Immunologic: Negative  Neurological: Negative      Psychiatric/Behavioral: Negative  Objective:      /82 (BP Location: Left arm, Patient Position: Sitting, Cuff Size: Large)   Pulse 92   Ht 5' 2" (1 575 m)   Wt 107 kg (235 lb)   BMI 42 98 kg/m²          Physical Exam   Constitutional: She is oriented to person, place, and time  She appears well-developed and well-nourished  HENT:   Head: Normocephalic and atraumatic  Neck: Normal range of motion  Neck supple  Pulmonary/Chest: Effort normal    Musculoskeletal: Normal range of motion  Neurological: She is alert and oriented to person, place, and time  She has normal reflexes  Psychiatric: She has a normal mood and affect   Her behavior is normal  Judgment and thought content normal

## 2021-02-10 DIAGNOSIS — N30.01 ACUTE CYSTITIS WITH HEMATURIA: ICD-10-CM

## 2021-02-12 RX ORDER — CEPHALEXIN 500 MG/1
CAPSULE ORAL
Qty: 186 CAPSULE | Refills: 1 | Status: SHIPPED | OUTPATIENT
Start: 2021-02-12 | End: 2021-05-12

## 2022-03-04 ENCOUNTER — TELEPHONE (OUTPATIENT)
Dept: OTHER | Facility: OTHER | Age: 72
End: 2022-03-04

## 2022-03-04 DIAGNOSIS — N39.0 URINARY TRACT INFECTION WITHOUT HEMATURIA, SITE UNSPECIFIED: Primary | ICD-10-CM

## 2022-03-04 NOTE — TELEPHONE ENCOUNTER
Agree with providers previous recommendation that she should be seen in follow-up before prescribing antibiotics  She has had no recent urine testing  If symptomatic, would recommend patient go for urine testing to determine need for antibiotic

## 2022-03-07 NOTE — TELEPHONE ENCOUNTER
Call placed to patient  She was not home and  answered the phone  He will have patient contact the office when she arrives home after lunch       **When patient calls back, please review recommendations of the CRNP listed in below encounter**

## 2022-03-08 NOTE — TELEPHONE ENCOUNTER
Call placed to patient and spoke with her  Informed her of the recommendations of the CRNP at this time  Pt stated that "I was placed on this antibiotic by Dr Carson Vazquez as a prophylactic regimen, but if I must submit a sample I will"  Pt stated she is not having any current symptoms, but will submit testing anyway  Pt uses [facility] and is requesting script be sent to this lab center  She is planning to go today for testing

## 2022-03-11 ENCOUNTER — APPOINTMENT (OUTPATIENT)
Dept: LAB | Facility: MEDICAL CENTER | Age: 72
End: 2022-03-11
Payer: COMMERCIAL

## 2022-03-11 DIAGNOSIS — N39.0 URINARY TRACT INFECTION WITHOUT HEMATURIA, SITE UNSPECIFIED: ICD-10-CM

## 2022-03-11 LAB
BACTERIA UR QL AUTO: ABNORMAL /HPF
BILIRUB UR QL STRIP: NEGATIVE
CLARITY UR: CLEAR
COLOR UR: YELLOW
GLUCOSE UR STRIP-MCNC: NEGATIVE MG/DL
HGB UR QL STRIP.AUTO: NEGATIVE
HYALINE CASTS #/AREA URNS LPF: ABNORMAL /LPF
KETONES UR STRIP-MCNC: NEGATIVE MG/DL
LEUKOCYTE ESTERASE UR QL STRIP: ABNORMAL
MUCOUS THREADS UR QL AUTO: ABNORMAL
NITRITE UR QL STRIP: NEGATIVE
NON-SQ EPI CELLS URNS QL MICRO: ABNORMAL /HPF
PH UR STRIP.AUTO: 5.5 [PH]
PROT UR STRIP-MCNC: ABNORMAL MG/DL
RBC #/AREA URNS AUTO: ABNORMAL /HPF
SP GR UR STRIP.AUTO: 1.04 (ref 1–1.03)
UROBILINOGEN UR STRIP-ACNC: 2 MG/DL
WBC #/AREA URNS AUTO: ABNORMAL /HPF

## 2022-03-11 PROCEDURE — 87086 URINE CULTURE/COLONY COUNT: CPT

## 2022-03-11 PROCEDURE — 81001 URINALYSIS AUTO W/SCOPE: CPT

## 2022-03-11 NOTE — TELEPHONE ENCOUNTER
Patient states no symptoms are reported right now  Patient was concerned and want to identify an infection if it was starting  Patient did go to [facility] for urine testing on 3/8/22  They did not do a urine culture only a UA  Advised patient to go to [place] [address] and repeat testing  Patient verbalized understanding and agreement and will get testing done

## 2022-03-11 NOTE — TELEPHONE ENCOUNTER
Patient is calling because she wants ABX; she feels an infection brewing per her bloodwork but she is denying any symptoms at this time  She is asking for a call back today; please call

## 2022-03-12 LAB — BACTERIA UR CULT: NORMAL

## 2022-03-14 NOTE — TELEPHONE ENCOUNTER
Call placed to Pt and she was offered a sooner Appointment to come in this Friday with SOURAV @ 1:15 PM

## 2022-03-18 ENCOUNTER — OFFICE VISIT (OUTPATIENT)
Dept: UROLOGY | Facility: MEDICAL CENTER | Age: 72
End: 2022-03-18
Payer: COMMERCIAL

## 2022-03-18 VITALS
SYSTOLIC BLOOD PRESSURE: 150 MMHG | BODY MASS INDEX: 41.64 KG/M2 | HEIGHT: 63 IN | WEIGHT: 235 LBS | HEART RATE: 125 BPM | DIASTOLIC BLOOD PRESSURE: 80 MMHG

## 2022-03-18 DIAGNOSIS — N30.01 ACUTE CYSTITIS WITH HEMATURIA: Primary | ICD-10-CM

## 2022-03-18 LAB
SL AMB  POCT GLUCOSE, UA: NORMAL
SL AMB LEUKOCYTE ESTERASE,UA: NORMAL
SL AMB POCT BILIRUBIN,UA: NORMAL
SL AMB POCT BLOOD,UA: NORMAL
SL AMB POCT CLARITY,UA: CLEAR
SL AMB POCT COLOR,UA: YELLOW
SL AMB POCT KETONES,UA: NORMAL
SL AMB POCT NITRITE,UA: NORMAL
SL AMB POCT PH,UA: 5.5
SL AMB POCT SPECIFIC GRAVITY,UA: 1.01
SL AMB POCT URINE PROTEIN: NORMAL
SL AMB POCT UROBILINOGEN: 0.2

## 2022-03-18 PROCEDURE — 81003 URINALYSIS AUTO W/O SCOPE: CPT | Performed by: NURSE PRACTITIONER

## 2022-03-18 PROCEDURE — 99214 OFFICE O/P EST MOD 30 MIN: CPT | Performed by: NURSE PRACTITIONER

## 2022-03-18 NOTE — PATIENT INSTRUCTIONS
Urinary Tract Infection in Women   WHAT YOU NEED TO KNOW:   A urinary tract infection (UTI) is caused by bacteria that get inside your urinary tract  Most bacteria that enter your urinary tract come out when you urinate  If the bacteria stay in your urinary tract, you may get an infection  Your urinary tract includes your kidneys, ureters, bladder, and urethra  Urine is made in your kidneys, and it flows from the ureters to the bladder  Urine leaves the bladder through the urethra  A UTI is more common in your lower urinary tract, which includes your bladder and urethra  DISCHARGE INSTRUCTIONS:   Return to the emergency department if:   · You are urinating very little or not at all  · You have a high fever with shaking chills  · You have side or back pain that gets worse  Call your doctor if:   · You have a fever  · You do not feel better after 2 days of taking antibiotics  · You are vomiting  · You have questions or concerns about your condition or care  Medicines:   · Antibiotics  help fight a bacterial infection  If you have UTIs often (called recurrent UTIs), you may be given antibiotics to take regularly  You will be given directions for when and how to use antibiotics  The goal is to prevent UTIs but not cause antibiotic resistance by using antibiotics too often  · Medicines  may be given to decrease pain and burning when you urinate  They will also help decrease the feeling that you need to urinate often  These medicines will make your urine orange or red  · Take your medicine as directed  Contact your healthcare provider if you think your medicine is not helping or if you have side effects  Tell him or her if you are allergic to any medicine  Keep a list of the medicines, vitamins, and herbs you take  Include the amounts, and when and why you take them  Bring the list or the pill bottles to follow-up visits   Carry your medicine list with you in case of an emergency  Follow up with your doctor as directed:  Write down your questions so you remember to ask them during your visits  Prevent another UTI:   · Empty your bladder often  Urinate and empty your bladder as soon as you feel the need  Do not hold your urine for long periods of time  · Wipe from front to back after you urinate or have a bowel movement  This will help prevent germs from getting into your urinary tract through your urethra  · Drink liquids as directed  Ask how much liquid to drink each day and which liquids are best for you  You may need to drink more liquids than usual to help flush out the bacteria  Do not drink alcohol, caffeine, or citrus juices  These can irritate your bladder and increase your symptoms  Your healthcare provider may recommend cranberry juice to help prevent a UTI  · Urinate after you have sex  This can help flush out bacteria passed during sex  · Do not douche or use feminine deodorants  These can change the chemical balance in your vagina  · Change sanitary pads or tampons often  This will help prevent germs from getting into your urinary tract  · Talk to your healthcare provider about your birth control method  You may need to change your method if it is increasing your risk for UTIs  · Wear cotton underwear and clothes that are loose  Tight pants and nylon underwear can trap moisture and cause bacteria to grow  · Vaginal estrogen may be recommended  This medicine helps prevent UTIs in women who have gone through menopause or are in francine-menopause  · Do pelvic muscle exercises often  Pelvic muscle exercises may help you start and stop urinating  Strong pelvic muscles may help you empty your bladder easier  Squeeze these muscles tightly for 5 seconds like you are trying to hold back urine  Then relax for 5 seconds  Gradually work up to squeezing for 10 seconds  Do 3 sets of 15 repetitions a day, or as directed      © Copyright [employer] 2022 Information is for [employer] use only and may not be sold, redistributed or otherwise used for commercial purposes  All illustrations and images included in CareNotes® are the copyrighted property of [employer]  or Ana Cristina Herrera  The above information is an  only  It is not intended as medical advice for individual conditions or treatments  Talk to your doctor, nurse or pharmacist before following any medical regimen to see if it is safe and effective for you

## 2022-03-18 NOTE — PROGRESS NOTES
3/18/2022      No chief complaint on file  Assessment and Plan    66 y o  female managed by our office    1  Chronic cystitis  · Urine culture performed 03/11/2022 negative for growth  · Urine dip in office unremarkable  · Patient has been managed on maintenance Keflex 500 mg p o  q h s   If at times of infection she then increased dose to q i d  x7 days per Dr Jax Connell  · Urine dip in office unremarkable  · Will trial time off of antibiotics and see if there is recurrence of urinary tract infections  · Patient will follow-up in the office in 3 months if no recurrence of urinary tract infections but that time will then follow up on an annual basis  · Return to the office in 3 months    History of Present Illness  Michaelle Miranda is a 66 y o  female here for follow up evaluation of  chronic urinary tract infections  Patient was previously managed on daily antibiotics-Keflex 500 mg p o  q h s     She reports over the course of the last 2 years she has had no incidences of urinary tract infections  She currently denies all lower urinary tract symptoms  Denies suprapubic abdominal pain and flank pain  Review of Systems   Constitutional: Negative for chills and fever  Respiratory: Negative for cough and shortness of breath  Cardiovascular: Negative for chest pain  Gastrointestinal: Negative for abdominal distention, abdominal pain, blood in stool, nausea and vomiting  Genitourinary: Negative for difficulty urinating, dysuria, enuresis, flank pain, frequency, hematuria and urgency  Skin: Negative for rash         Past Medical History  Past Medical History:   Diagnosis Date    Cancer ([place] [address] )     Change in bowel function     Diverticulosis     GERD (gastroesophageal reflux disease)     Hypertension     Kidney stone     [facility] )     Mixed hyperlipidemia     Renal cyst     Urinary frequency        Past Social History  Past Surgical History:   Procedure Laterality Date    CATARACT EXTRACTION Bilateral 2010    COLONOSCOPY  2008    HYSTERECTOMY  2019    INSERTION PROSTATE RADIATION SEED Bilateral 2014    erroroneous entry    OTHER SURGICAL HISTORY  1980    Excision of "lump" from patient's neck  Patient denies   [address]    ID CYSTOURETHROSCOPY,BIOPSY N/A 9/11/2019    Procedure: Maximilian Duong, with bladder BIOPSIES and fulgration;  Surgeon: Racheal Oseguera MD;  Location: [facility] OR;  Service: Urology    PROSTATE BIOPSY      erroneous entry     Social History     Tobacco Use   Smoking Status Never Smoker   Smokeless Tobacco Never Used       Past Family History  Family History   Problem Relation Age of Onset    Alzheimer's disease Father     Diabetes Father        Past Social history  Social History     Socioeconomic History    Marital status: /Civil Union     Spouse name: Not on file    Number of children: Not on file    Years of education: Not on file    Highest education level: Not on file   Occupational History    Occupation:      Comment: Retired    Occupation:      Comment: Current   Tobacco Use    Smoking status: Never Smoker    Smokeless tobacco: Never Used   Substance and Sexual Activity    Alcohol use:  Yes     Alcohol/week: 2 0 standard drinks     Types: 2 Glasses of wine per week     Comment: once a week or less    Drug use: No    Sexual activity: Not on file   Other Topics Concern    Not on file   Social History Narrative    Not on file     Social Determinants of Health     Financial Resource Strain: Not on file   Food Insecurity: Not on file   Transportation Needs: Not on file   Physical Activity: Not on file   Stress: Not on file   Social Connections: Not on file   Intimate Partner Violence: Not on file   Housing Stability: Not on file       Current Medications  Current Outpatient Medications   Medication Sig Dispense Refill    BIOTIN PO Take 1 capsule by mouth daily       Cholecalciferol (VITAMIN D3) 5000 units CAPS Take by mouth daily      lisinopril (ZESTRIL) 10 mg tablet Take 10 mg by mouth 2 (two) times a day      Magnesium 500 MG CAPS Take by mouth daily      metFORMIN (GLUCOPHAGE) 500 mg tablet Take 500 mg by mouth daily with breakfast  3    Multiple Vitamin (MULTIVITAMIN) capsule Take 1 capsule by mouth daily      naproxen (NAPROSYN) 500 mg tablet Take 500 mg by mouth 2 (two) times a day with meals  11    omeprazole (PriLOSEC) 20 mg delayed release capsule Take 20 mg by mouth daily  3    atorvastatin (LIPITOR) 20 mg tablet Take 20 mg by mouth daily      ibuprofen (MOTRIN) 600 mg tablet Take 1 tablet (600 mg total) by mouth every 6 (six) hours as needed for mild pain 20 tablet 0     No current facility-administered medications for this visit  Allergies  No Known Allergies      The following portions of the patient's history were reviewed and updated as appropriate: allergies, current medications, past medical history, past social history, past surgical history and problem list       Vitals  Vitals:    03/18/22 1315   BP: 150/80   Pulse: (!) 125   Weight: 107 kg (235 lb)   Height: 5' 3" (1 6 m)           Physical Exam  Physical Exam  Vitals reviewed  Constitutional:       General: She is not in acute distress  Appearance: Normal appearance  Cardiovascular:      Heart sounds: Normal heart sounds  Pulmonary:      Effort: Pulmonary effort is normal  No respiratory distress  Breath sounds: Normal breath sounds  Abdominal:      Tenderness: There is no right CVA tenderness or left CVA tenderness  Musculoskeletal:         General: Normal range of motion  Skin:     General: Skin is warm and dry  Neurological:      General: No focal deficit present  Mental Status: She is alert     Psychiatric:         Mood and Affect: Mood normal          Behavior: Behavior normal        Results  Recent Results (from the past 1 hour(s))   POCT urine dip auto non-scope    Collection Time: 03/18/22  1:23 PM   Result Value Ref Range     COLOR,UA yellow     CLARITY,UA clear     SPECIFIC GRAVITY,UA 1 010      PH,UA 5 5     LEUKOCYTE ESTERASE,UA neg     NITRITE,UA neg     GLUCOSE, UA neg     KETONES,UA neg     BILIRUBIN,UA neg     BLOOD,UA neg     POCT URINE PROTEIN neg     SL AMB POCT UROBILINOGEN 0 2    ]  No results found for: PSA  Lab Results   Component Value Date    CALCIUM 9 5 08/30/2019    K 4 2 08/30/2019    CO2 27 08/30/2019     08/30/2019    BUN 19 08/30/2019    CREATININE 0 91 08/30/2019     Lab Results   Component Value Date    WBC 8 33 08/30/2019    HGB 10 9 (L) 08/30/2019    HCT 37 2 08/30/2019    MCV 82 08/30/2019     08/30/2019     Orders  Orders Placed This Encounter   Procedures    POCT urine dip auto non-scope       SOURAV Sharif

## 2022-04-12 ENCOUNTER — APPOINTMENT (OUTPATIENT)
Dept: LAB | Facility: MEDICAL CENTER | Age: 72
End: 2022-04-12
Payer: COMMERCIAL

## 2022-04-12 ENCOUNTER — TELEPHONE (OUTPATIENT)
Dept: OTHER | Facility: OTHER | Age: 72
End: 2022-04-12

## 2022-04-12 DIAGNOSIS — N39.0 URINARY TRACT INFECTION WITHOUT HEMATURIA, SITE UNSPECIFIED: ICD-10-CM

## 2022-04-12 DIAGNOSIS — R39.9 UTI SYMPTOMS: Primary | ICD-10-CM

## 2022-04-12 LAB
BACTERIA UR QL AUTO: ABNORMAL /HPF
BILIRUB UR QL STRIP: NEGATIVE
CLARITY UR: ABNORMAL
COLOR UR: ABNORMAL
GLUCOSE UR STRIP-MCNC: NEGATIVE MG/DL
HGB UR QL STRIP.AUTO: ABNORMAL
KETONES UR STRIP-MCNC: NEGATIVE MG/DL
LEUKOCYTE ESTERASE UR QL STRIP: ABNORMAL
MUCOUS THREADS UR QL AUTO: ABNORMAL
NITRITE UR QL STRIP: POSITIVE
NON-SQ EPI CELLS URNS QL MICRO: ABNORMAL /HPF
PH UR STRIP.AUTO: 5.5 [PH]
PROT UR STRIP-MCNC: ABNORMAL MG/DL
RBC #/AREA URNS AUTO: ABNORMAL /HPF
SP GR UR STRIP.AUTO: 1.03 (ref 1–1.03)
UROBILINOGEN UR STRIP-ACNC: 2 MG/DL
WBC #/AREA URNS AUTO: ABNORMAL /HPF
WBC CLUMPS # UR AUTO: PRESENT /UL

## 2022-04-12 PROCEDURE — 87186 SC STD MICRODIL/AGAR DIL: CPT

## 2022-04-12 PROCEDURE — 81001 URINALYSIS AUTO W/SCOPE: CPT

## 2022-04-12 PROCEDURE — 87086 URINE CULTURE/COLONY COUNT: CPT

## 2022-04-12 PROCEDURE — 87077 CULTURE AEROBIC IDENTIFY: CPT

## 2022-04-12 NOTE — TELEPHONE ENCOUNTER
Symptoms as verbalized by patient  Started on Sunday  Has been getting up more at night     Office seen: [facility]orl[facility]   S/P:3/18/22  Pain: burning / pressure/pain   Nausea/Vomiting: none   Fever/Chills: none   Bowel Movements: yes   Urine color: bright yellow/ cloudy   Frequency/Urgencey: yes more than normal   Stream: sometime patient does go and then has to go small amounts an hour  Medication: Patient is out of Keflex 500mg   Has been off of the medication for 2 months  Patient states while taking the medication she has not had an infection  Now that she has not taken it the infections symptoms have returned  Encouraged hydration with water 40-60oz of water daily  Avoiding Bladder irritants such as coffee,tea,soda,carbonated beverages  Limiting your alcohol intake  Avoiding constipation  Continue taking medications as prescribed  Reducing cigarette smoking  Advised patient monitor/contact our office with fever above 101 0, chills, decreased urination or unable to urinate, blood or clots in the urine  Health Call after hours protocol reviewed  Ed precautions reviewed   Patient verbalized understanding/ Agreement       Pharmacy confirmed as :  [facility] [address] [facility]

## 2022-04-14 LAB — BACTERIA UR CULT: ABNORMAL

## 2022-04-14 RX ORDER — CEPHALEXIN 500 MG/1
500 CAPSULE ORAL EVERY 12 HOURS SCHEDULED
Qty: 10 CAPSULE | Refills: 0 | Status: SHIPPED | OUTPATIENT
Start: 2022-04-14 | End: 2022-04-19

## 2022-04-14 NOTE — TELEPHONE ENCOUNTER
Called Mare Staples back and notified her that her urine studies do show an infection but the final sensitivity  is not back yet  Keflex has been sent to her pharmacy and if final results come back different we will call her    She was also instructed to hydrate with water

## 2022-04-14 NOTE — TELEPHONE ENCOUNTER
Results of recent urine culture were positive for infection  Final sensitivity remains pending  Will send prescription for [facility] pharmacy and call if she requires a different antibiotic

## 2022-04-28 ENCOUNTER — TELEPHONE (OUTPATIENT)
Dept: UROLOGY | Facility: MEDICAL CENTER | Age: 72
End: 2022-04-28

## (undated) DEVICE — TELFA NON-ADHERENT ABSORBENT DRESSING: Brand: TELFA

## (undated) DEVICE — TRANSPOSAL ULTRAFLEX DUO/QUAD ULTRA CART MANIFOLD

## (undated) DEVICE — PACK TUR

## (undated) DEVICE — TUBING SUCTION 5MM X 12 FT

## (undated) DEVICE — SCD SEQUENTIAL COMPRESSION COMFORT SLEEVE MEDIUM KNEE LENGTH: Brand: KENDALL SCD

## (undated) DEVICE — UROCATCH BAG

## (undated) DEVICE — EXIDINE 4 PCT

## (undated) DEVICE — PREMIUM DRY TRAY LF: Brand: MEDLINE INDUSTRIES, INC.

## (undated) DEVICE — LUBRICANT SURGILUBE TUBE 4 OZ  FLIP TOP

## (undated) DEVICE — GLOVE SRG BIOGEL 7